# Patient Record
Sex: MALE | Race: OTHER | Employment: FULL TIME | URBAN - METROPOLITAN AREA
[De-identification: names, ages, dates, MRNs, and addresses within clinical notes are randomized per-mention and may not be internally consistent; named-entity substitution may affect disease eponyms.]

---

## 2017-04-24 NOTE — TELEPHONE ENCOUNTER
Patient is calling to request a refill for medication listed below. Please advise. Amlodipine-Olmesartan (ANTONIO) 10-40 MG Oral Tab Take 1 tablet by mouth daily.  Disp: 90 tablet Rfl: 2

## 2017-04-26 RX ORDER — AMLODIPINE AND OLMESARTAN MEDOXOMIL 10; 40 MG/1; MG/1
1 TABLET ORAL DAILY
Qty: 90 TABLET | Refills: 0 | OUTPATIENT
Start: 2017-04-26

## 2017-04-26 NOTE — TELEPHONE ENCOUNTER
Pt states he cant come see him unless it's a Saturday. Pt is asking if MD can please see him this Saturday, states the earliest possible. Pt states due to his job, it's hard for him to come in. Please advise.

## 2017-04-26 NOTE — TELEPHONE ENCOUNTER
Protocol failed.      CSS please schedule appt with PCP  Hypertensive Medications  Protocol Criteria:  · Appointment scheduled in the past 6 months or in the next 3 months  · BMP or CMP in the past 12 months  · Creatinine result < 2  Recent Visits       Pro

## 2017-05-22 ENCOUNTER — OFFICE VISIT (OUTPATIENT)
Dept: FAMILY MEDICINE CLINIC | Facility: CLINIC | Age: 32
End: 2017-05-22

## 2017-05-22 VITALS
TEMPERATURE: 99 F | HEIGHT: 71 IN | WEIGHT: 241 LBS | DIASTOLIC BLOOD PRESSURE: 100 MMHG | SYSTOLIC BLOOD PRESSURE: 180 MMHG | BODY MASS INDEX: 33.74 KG/M2 | HEART RATE: 73 BPM

## 2017-05-22 DIAGNOSIS — I10 ESSENTIAL HYPERTENSION WITH GOAL BLOOD PRESSURE LESS THAN 130/80: Primary | ICD-10-CM

## 2017-05-22 DIAGNOSIS — R73.03 PREDIABETES: ICD-10-CM

## 2017-05-22 DIAGNOSIS — E78.2 MIXED HYPERLIPIDEMIA: ICD-10-CM

## 2017-05-22 PROCEDURE — 99212 OFFICE O/P EST SF 10 MIN: CPT | Performed by: FAMILY MEDICINE

## 2017-05-22 PROCEDURE — 99214 OFFICE O/P EST MOD 30 MIN: CPT | Performed by: FAMILY MEDICINE

## 2017-05-22 RX ORDER — ATORVASTATIN CALCIUM 40 MG/1
TABLET, FILM COATED ORAL
Qty: 90 TABLET | Refills: 2 | Status: SHIPPED | OUTPATIENT
Start: 2017-05-22 | End: 2018-02-08

## 2017-05-22 RX ORDER — AMLODIPINE AND OLMESARTAN MEDOXOMIL 10; 40 MG/1; MG/1
1 TABLET ORAL DAILY
Qty: 90 TABLET | Refills: 2 | Status: SHIPPED | OUTPATIENT
Start: 2017-05-22 | End: 2018-02-08

## 2017-05-22 NOTE — PROGRESS NOTES
5/22/2017  6:40 PM    Abdullahi Sanchez is a 32year old male. Chief complaint(s): Patient presents with: Follow - Up: Patient needs refills on his medications.    HTN  Hyperlipidemia    HPI:     Lon Cisse primary complaint is regarding Immunization History  Administered            Date(s) Administered    TDAP                  12/17/2015      Medications (Active prior to today's visit):    Current Outpatient Prescriptions:  Amlodipine-Olmesartan (ANTONIO) 10-40 MG Oral Tab Take 1 tablet - 200 mg/dL   Triglycerides 78 1 - 149 mg/dL   LDL Cholesterol 82 0 - 99 mg/dL   Non HDL Chol 98 <130 mg/dL     EKG / Spirometry : -     Radiology: No results found. -    ASSESSMENT/PLAN:   Assessment  Essential hypertension with goal blood pressure less t concerns. Notify Dr Ramy Mejia or the [facility], [facility] if there is a deterioration or worsening of the medical condition. Also, inform the doctor with any new symptoms or medications' side effects. REFUSALS:  none.     FOLLOW-UP: Schedule a follow-up visit

## 2017-05-26 ENCOUNTER — APPOINTMENT (OUTPATIENT)
Dept: LAB | Age: 32
End: 2017-05-26
Attending: FAMILY MEDICINE
Payer: COMMERCIAL

## 2017-05-26 DIAGNOSIS — E78.2 MIXED HYPERLIPIDEMIA: ICD-10-CM

## 2017-05-26 DIAGNOSIS — I10 ESSENTIAL HYPERTENSION WITH GOAL BLOOD PRESSURE LESS THAN 130/80: ICD-10-CM

## 2017-05-26 DIAGNOSIS — R73.03 PREDIABETES: ICD-10-CM

## 2017-05-26 PROCEDURE — 80053 COMPREHEN METABOLIC PANEL: CPT

## 2017-05-26 PROCEDURE — 36415 COLL VENOUS BLD VENIPUNCTURE: CPT

## 2017-05-26 PROCEDURE — 80061 LIPID PANEL: CPT

## 2017-05-26 PROCEDURE — 83036 HEMOGLOBIN GLYCOSYLATED A1C: CPT

## 2017-05-29 ENCOUNTER — TELEPHONE (OUTPATIENT)
Dept: INTERNAL MEDICINE CLINIC | Facility: CLINIC | Age: 32
End: 2017-05-29

## 2017-05-29 NOTE — TELEPHONE ENCOUNTER
Results and recommendations sent via [employer].     ----- Message from Aby Becker MD sent at 5/28/2017  9:24 PM CDT -----  Please call patient inform of elevated triglycerides. Is he taking medication?   If so we need to add another if not to start and

## 2017-05-29 NOTE — PROGRESS NOTES
Quick Note:    Please call patient inform of elevated triglycerides. Is he taking medication?  If so we need to add another if not to start and recheck in 6 months.  ______

## 2017-06-02 NOTE — TELEPHONE ENCOUNTER
Pt has not yet viewed [employer] results and message.   TCB may transfer to [phone]

## 2018-02-09 RX ORDER — AMLODIPINE AND OLMESARTAN MEDOXOMIL 10; 40 MG/1; MG/1
TABLET ORAL
Qty: 90 TABLET | Refills: 0 | Status: SHIPPED | OUTPATIENT
Start: 2018-02-09 | End: 2018-05-28

## 2018-02-09 RX ORDER — ATORVASTATIN CALCIUM 40 MG/1
TABLET, FILM COATED ORAL
Qty: 90 TABLET | Refills: 0 | Status: SHIPPED | OUTPATIENT
Start: 2018-02-09 | End: 2018-05-28

## 2018-03-26 ENCOUNTER — OFFICE VISIT (OUTPATIENT)
Dept: FAMILY MEDICINE CLINIC | Facility: CLINIC | Age: 33
End: 2018-03-26

## 2018-03-26 ENCOUNTER — LAB ENCOUNTER (OUTPATIENT)
Dept: LAB | Age: 33
End: 2018-03-26
Attending: FAMILY MEDICINE
Payer: COMMERCIAL

## 2018-03-26 VITALS
TEMPERATURE: 98 F | DIASTOLIC BLOOD PRESSURE: 88 MMHG | BODY MASS INDEX: 31 KG/M2 | HEART RATE: 84 BPM | WEIGHT: 224 LBS | SYSTOLIC BLOOD PRESSURE: 149 MMHG

## 2018-03-26 DIAGNOSIS — E11.9 TYPE 2 DIABETES MELLITUS WITHOUT COMPLICATION, WITHOUT LONG-TERM CURRENT USE OF INSULIN (HCC): ICD-10-CM

## 2018-03-26 DIAGNOSIS — R01.1 CARDIAC MURMUR: ICD-10-CM

## 2018-03-26 DIAGNOSIS — E11.9 TYPE 2 DIABETES MELLITUS WITHOUT COMPLICATION, WITHOUT LONG-TERM CURRENT USE OF INSULIN (HCC): Primary | ICD-10-CM

## 2018-03-26 DIAGNOSIS — I10 ESSENTIAL HYPERTENSION WITH GOAL BLOOD PRESSURE LESS THAN 130/80: ICD-10-CM

## 2018-03-26 LAB
ALBUMIN SERPL BCP-MCNC: 4.4 G/DL (ref 3.5–4.8)
ALBUMIN/GLOB SERPL: 1.4 {RATIO} (ref 1–2)
ALP SERPL-CCNC: 92 U/L (ref 32–100)
ALT SERPL-CCNC: 46 U/L (ref 17–63)
ANION GAP SERPL CALC-SCNC: 8 MMOL/L (ref 0–18)
AST SERPL-CCNC: 28 U/L (ref 15–41)
BILIRUB SERPL-MCNC: 0.5 MG/DL (ref 0.3–1.2)
BUN SERPL-MCNC: 10 MG/DL (ref 8–20)
BUN/CREAT SERPL: 12.5 (ref 10–20)
CALCIUM SERPL-MCNC: 9.2 MG/DL (ref 8.5–10.5)
CHLORIDE SERPL-SCNC: 100 MMOL/L (ref 95–110)
CO2 SERPL-SCNC: 27 MMOL/L (ref 22–32)
CREAT SERPL-MCNC: 0.8 MG/DL (ref 0.5–1.5)
CREAT UR-MCNC: 76 MG/DL
GLOBULIN PLAS-MCNC: 3.2 G/DL (ref 2.5–3.7)
GLUCOSE SERPL-MCNC: 198 MG/DL (ref 70–99)
MICROALBUMIN UR-MCNC: 0.7 MG/DL (ref 0–1.8)
MICROALBUMIN/CREAT UR: 9.2 MG/G{CREAT} (ref 0–20)
OSMOLALITY UR CALC.SUM OF ELEC: 285 MOSM/KG (ref 275–295)
PATIENT FASTING: YES
POTASSIUM SERPL-SCNC: 3.8 MMOL/L (ref 3.3–5.1)
PROT SERPL-MCNC: 7.6 G/DL (ref 5.9–8.4)
SODIUM SERPL-SCNC: 135 MMOL/L (ref 136–144)

## 2018-03-26 PROCEDURE — 93005 ELECTROCARDIOGRAM TRACING: CPT

## 2018-03-26 PROCEDURE — 99214 OFFICE O/P EST MOD 30 MIN: CPT | Performed by: FAMILY MEDICINE

## 2018-03-26 PROCEDURE — 99212 OFFICE O/P EST SF 10 MIN: CPT | Performed by: FAMILY MEDICINE

## 2018-03-26 PROCEDURE — 82570 ASSAY OF URINE CREATININE: CPT

## 2018-03-26 PROCEDURE — 82043 UR ALBUMIN QUANTITATIVE: CPT

## 2018-03-26 PROCEDURE — 80053 COMPREHEN METABOLIC PANEL: CPT

## 2018-03-26 PROCEDURE — 36415 COLL VENOUS BLD VENIPUNCTURE: CPT

## 2018-03-26 PROCEDURE — 83036 HEMOGLOBIN GLYCOSYLATED A1C: CPT

## 2018-03-26 PROCEDURE — 93010 ELECTROCARDIOGRAM REPORT: CPT | Performed by: FAMILY MEDICINE

## 2018-03-26 NOTE — PROGRESS NOTES
3/26/2018  5:52 PM    Howard Alvarado is a 28year old male. Chief complaint(s): No chief complaint on file. HPI:     Howard Alvarado primary complaint is regarding DM.      Patient Howard Alvarado is a 28year old male is here to be evaluated for typ Cigarettes  Smokeless tobacco: Former User                     Alcohol use:  No                 Immunizations:     Immunization History  Administered            Date(s) Administered    TDAP                  12/17/2015      Medications (Active prior to today EKG / Spirometry : -     Radiology: No results found. -    ASSESSMENT/PLAN:   Assessment   Type 2 diabetes mellitus without complication, without long-term current use of insulin (hcc)  (primary encounter diagnosis)  Cardiac murmur  Essential hypertens goal blood pressure less than 130/80     MEDICATIONS: CPM  LABORATORY & ORDERS:   Orders Placed This Encounter      ** Hemoglobin A1C  [E]      ** Microalbumin [id number]      Comp Metabolic Panel (14) [E]  RECOMMENDATIONS given include: avoid pseudoephedrine

## 2018-03-27 ENCOUNTER — TELEPHONE (OUTPATIENT)
Dept: FAMILY MEDICINE CLINIC | Facility: CLINIC | Age: 33
End: 2018-03-27

## 2018-03-27 DIAGNOSIS — E11.9 CONTROLLED TYPE 2 DIABETES MELLITUS WITHOUT COMPLICATION, WITHOUT LONG-TERM CURRENT USE OF INSULIN (HCC): Primary | ICD-10-CM

## 2018-03-27 LAB — HBA1C MFR BLD: 10.2 % (ref 4–6)

## 2018-03-27 NOTE — TELEPHONE ENCOUNTER
Advised patient on Dr. Deb Schwab information and recommendations, including name of medication, dose, administration. Patient verbalized understanding. His follow-up appt is 4/9/18 at 5 pm. Advised to call back if any issues; he agreed.

## 2018-03-27 NOTE — TELEPHONE ENCOUNTER
Wife (not on HIPAA), patient gave verbal permission to speak to wife. Script was not at pharmacy. New script created, sent to pharmacy (see below). Advised to call back if any issues; wife agreed.   Pharmacy     [address], [place]

## 2018-03-27 NOTE — TELEPHONE ENCOUNTER
----- Message from Chris Coburn MD sent at 3/27/2018  9:01 AM CDT -----  Please call patient, results showed uncontrol diabetes, Rx sent, KPA

## 2018-03-28 DIAGNOSIS — E11.9 CONTROLLED TYPE 2 DIABETES MELLITUS WITHOUT COMPLICATION, WITHOUT LONG-TERM CURRENT USE OF INSULIN (HCC): ICD-10-CM

## 2018-03-28 NOTE — TELEPHONE ENCOUNTER
Katerina (wife) returned call to follow up on Contour Next lancet and strips refill request.  Pt is diabetic and is testing three times per day. Contour next lancet and strips refilled each for 3 months supply.     Diabetes Medications  Protocol Criteria:  ·

## 2018-03-29 ENCOUNTER — TELEPHONE (OUTPATIENT)
Dept: OTHER | Age: 33
End: 2018-03-29

## 2018-03-29 RX ORDER — LANCETS
EACH MISCELLANEOUS
Qty: 100 EACH | Refills: 3 | Status: SHIPPED | OUTPATIENT
Start: 2018-03-29

## 2018-03-29 NOTE — TELEPHONE ENCOUNTER
----- Message from Genaro Carty MD sent at 3/27/2018  9:01 AM CDT -----  Please call patient, results showed uncontrol diabetes, Rx sent, KPA

## 2018-03-29 NOTE — TELEPHONE ENCOUNTER
Patient already advised on the results on 3/27/18. He started taking the medication as ordered and is testing his blood sugar. Two days ago, before the new medication, his blood sugar was 250. Yesterday his evening blood sugar was 137.  Today fasting blood

## 2018-04-02 ENCOUNTER — HOSPITAL ENCOUNTER (OUTPATIENT)
Dept: CV DIAGNOSTICS | Facility: HOSPITAL | Age: 33
Discharge: HOME | End: 2018-04-02
Attending: FAMILY MEDICINE
Payer: COMMERCIAL

## 2018-04-02 DIAGNOSIS — R01.1 CARDIAC MURMUR: ICD-10-CM

## 2018-04-02 PROCEDURE — 93306 TTE W/DOPPLER COMPLETE: CPT | Performed by: FAMILY MEDICINE

## 2018-04-09 ENCOUNTER — OFFICE VISIT (OUTPATIENT)
Dept: FAMILY MEDICINE CLINIC | Facility: CLINIC | Age: 33
End: 2018-04-09

## 2018-04-09 VITALS
SYSTOLIC BLOOD PRESSURE: 144 MMHG | HEART RATE: 87 BPM | TEMPERATURE: 99 F | HEIGHT: 71 IN | WEIGHT: 222 LBS | BODY MASS INDEX: 31.08 KG/M2 | DIASTOLIC BLOOD PRESSURE: 82 MMHG

## 2018-04-09 DIAGNOSIS — E11.9 TYPE 2 DIABETES MELLITUS WITHOUT COMPLICATION, WITHOUT LONG-TERM CURRENT USE OF INSULIN (HCC): Primary | ICD-10-CM

## 2018-04-09 DIAGNOSIS — R01.1 CARDIAC MURMUR: ICD-10-CM

## 2018-04-09 PROCEDURE — 99214 OFFICE O/P EST MOD 30 MIN: CPT | Performed by: FAMILY MEDICINE

## 2018-04-09 PROCEDURE — 99212 OFFICE O/P EST SF 10 MIN: CPT | Performed by: FAMILY MEDICINE

## 2018-04-09 NOTE — PROGRESS NOTES
4/9/2018  5:06 PM    Rosalina Srinivasan is a 28year old male. Chief complaint(s): Patient presents with: Follow - Up: patient here to discuss lab results   Diabetes  Eye Pain: watery eyes    HPI:     Rosalina Srinivasan primary complaint is regarding DM. Grandmother      HD ?       Social History: Smoking status: Former Smoker                                                              Packs/day: 0.25      Years: 0.00         Types: Cigarettes  Smokeless tobacco: Former User                     Alcohol use Ear: External ear normal.   Left Ear: External ear normal.   Nose: Nose normal. No rhinorrhea. Mouth/Throat: Oropharynx is clear and moist and mucous membranes are normal.   Eyes: Conjunctivae are normal. No scleral icterus. Neck: Neck supple.    Cardio A&P    LABORATORY & ORDERS: Blood test(s) ordered today ;   Orders Placed This Encounter      ** Hemoglobin A1C  [E]  Additional orders include:   MEDICATIONS:  •  MICROLET LANCETS Does not apply Misc, TEST BLOOD SUGAR THREE TIMES DAILY, Disp: 100 each, Rf questions or concerns. Notify Dr Dev Mccracken or the [facility], [facility] if there is a deterioration or worsening of the medical condition. Also, inform the doctor with any new symptoms or medications' side effects.       FOLLOW-UP: Schedule a follow-up visit in  p

## 2018-05-28 DIAGNOSIS — E11.9 CONTROLLED TYPE 2 DIABETES MELLITUS WITHOUT COMPLICATION, WITHOUT LONG-TERM CURRENT USE OF INSULIN (HCC): ICD-10-CM

## 2018-05-29 NOTE — TELEPHONE ENCOUNTER
From: Gilford Fragmin  Sent: 5/28/2018 9:32 PM CDT  Subject: Medication Renewal Request    Gilford Fragmin would like a refill of the following medications:     ATORVASTATIN 40 MG Oral Tab Edouard Norris MD]     Glucose Blood (HÉCTOR CONTOUR NEXT TEST)

## 2018-05-29 NOTE — TELEPHONE ENCOUNTER
From: Shira Pantoja  Sent: 5/28/2018 9:30 PM CDT  Subject: Medication Renewal Request    Shira Pantoja would like a refill of the following medications:     AMLODIPINE-OLMESARTAN 10-40 MG Oral Tab Luis Callahan MD]    Preferred pharmacy: [facility]

## 2018-05-31 RX ORDER — ATORVASTATIN CALCIUM 40 MG/1
TABLET, FILM COATED ORAL
Qty: 90 TABLET | Refills: 0 | Status: SHIPPED | OUTPATIENT
Start: 2018-05-31 | End: 2018-08-31

## 2018-05-31 RX ORDER — AMLODIPINE AND OLMESARTAN MEDOXOMIL 10; 40 MG/1; MG/1
1 TABLET ORAL
Qty: 90 TABLET | Refills: 0 | Status: SHIPPED | OUTPATIENT
Start: 2018-05-31 | End: 2018-08-31

## 2018-05-31 NOTE — TELEPHONE ENCOUNTER
Hypertensive Medications  Protocol Criteria:  · Appointment scheduled in the past 6 months or in the next 3 months  · BMP or CMP in the past 12 months  · Creatinine result < 2  Recent Outpatient Visits            1 month ago Type 2 diabetes mellitus withou

## 2018-05-31 NOTE — TELEPHONE ENCOUNTER
Patient failed protocol. Script pended. Please advise.     Cholesterol Medications  Protocol Criteria:  · Appointment scheduled in the past 12 months or in the next 3 months  · ALT & LDL on file in the past 12 months  · ALT result < 80  · LDL result <130 Demetrius Triana MD    Office Visit    1 year ago Essential hypertension with goal blood pressure less than 130/80    [facility], [facility], [address], [place] Demetrius Triana MD    Office Visit    2 years ago Essential hypertension with goal blood pressu

## 2018-06-19 NOTE — TELEPHONE ENCOUNTER
From: Tylor Gomez  Sent: 6/19/2018 9:45 AM CDT  Subject: Medication Renewal Request    Tylor Gomez would like a refill of the following medications:      SITagliptin-MetFORMIN HCl  MG Oral Tab Maricarmen Florence MD]    Preferred pharmacy: [facility]

## 2018-06-20 NOTE — TELEPHONE ENCOUNTER
Chart reviewed, on 3/27/18 sitagliptin-metformin 50-500mg one tablet twice daily refilled by staff for quantity 180 plus 2 additional refills. Good for 9 months total.   Left message at [address] [employer] voice mailbox to proceed with next refill.  If any q

## 2018-07-28 ENCOUNTER — LAB ENCOUNTER (OUTPATIENT)
Dept: LAB | Age: 33
End: 2018-07-28
Attending: FAMILY MEDICINE
Payer: COMMERCIAL

## 2018-07-28 DIAGNOSIS — E11.9 TYPE 2 DIABETES MELLITUS WITHOUT COMPLICATION, WITHOUT LONG-TERM CURRENT USE OF INSULIN (HCC): ICD-10-CM

## 2018-07-28 PROCEDURE — 36415 COLL VENOUS BLD VENIPUNCTURE: CPT

## 2018-07-28 PROCEDURE — 83036 HEMOGLOBIN GLYCOSYLATED A1C: CPT

## 2018-07-29 LAB — HBA1C MFR BLD: 6 % (ref 4–6)

## 2018-08-04 ENCOUNTER — OFFICE VISIT (OUTPATIENT)
Dept: FAMILY MEDICINE CLINIC | Facility: CLINIC | Age: 33
End: 2018-08-04

## 2018-08-04 VITALS
HEART RATE: 71 BPM | DIASTOLIC BLOOD PRESSURE: 78 MMHG | WEIGHT: 224 LBS | SYSTOLIC BLOOD PRESSURE: 131 MMHG | HEIGHT: 71 IN | TEMPERATURE: 98 F | BODY MASS INDEX: 31.36 KG/M2

## 2018-08-04 DIAGNOSIS — E11.9 TYPE 2 DIABETES MELLITUS WITHOUT COMPLICATION, WITHOUT LONG-TERM CURRENT USE OF INSULIN (HCC): Primary | ICD-10-CM

## 2018-08-04 DIAGNOSIS — I10 ESSENTIAL HYPERTENSION WITH GOAL BLOOD PRESSURE LESS THAN 130/80: ICD-10-CM

## 2018-08-04 PROCEDURE — 99212 OFFICE O/P EST SF 10 MIN: CPT | Performed by: FAMILY MEDICINE

## 2018-08-04 PROCEDURE — 99214 OFFICE O/P EST MOD 30 MIN: CPT | Performed by: FAMILY MEDICINE

## 2018-08-04 NOTE — PROGRESS NOTES
8/4/2018  9:25 AM    Francine Nicole is a 35year old male. Chief complaint(s): Patient presents with:  HTN: follow up   Diabetes: follow up     HPI:     Francine Nicole primary complaint is regarding as above.      Patient Quinton Moyer a 35 year o no surgical interventions were required, self expeled      Past Surgical History:  age 3: OTHER SURGICAL HISTORY      Comment: facial plastic surgery 2nd to dog bite   Family History   Problem Relation Age of Onset   • Gastro-Intestinal Disorder Father neck pain. Skin: Negative for rash. Neurological: Negative for seizures and headaches. Psychiatric/Behavioral: Negative for depressed mood. PHYSICAL EXAM:   Physical Exam    Constitutional: He appears well-developed and well-nourished.     Strip, Test blood sugar three times per day., Disp: 100 strip, Rfl: 3  •  MICROLET LANCETS Does not apply Misc, TEST BLOOD SUGAR THREE TIMES DAILY, Disp: 100 each, Rfl: 3  •  SITagliptin-MetFORMIN HCl  MG Oral Tab, Take 1 tablet by mouth 2 (two) time Encounter      ** Hemoglobin A1C  [E]      ** Microalbumin [id number]    Meds This Visit:    No prescriptions requested or ordered in this encounter    Imaging & Referrals:  Everardo Malcolm MD

## 2018-09-01 RX ORDER — AMLODIPINE AND OLMESARTAN MEDOXOMIL 10; 40 MG/1; MG/1
TABLET ORAL
Qty: 90 TABLET | Refills: 0 | Status: SHIPPED | OUTPATIENT
Start: 2018-09-01 | End: 2018-11-21

## 2018-09-01 RX ORDER — ATORVASTATIN CALCIUM 40 MG/1
TABLET, FILM COATED ORAL
Qty: 90 TABLET | Refills: 0 | Status: SHIPPED | OUTPATIENT
Start: 2018-09-01 | End: 2018-11-27

## 2018-09-01 NOTE — TELEPHONE ENCOUNTER
Please review and advise regarding pended refill request as unable to refill per protocol.     Other med(s) refilled per [facility], [facility] protocol:    Hypertensive Medications  Protocol Criteria:  · Appointment scheduled in the past 6 months or in the next 3

## 2018-09-06 ENCOUNTER — TELEPHONE (OUTPATIENT)
Dept: FAMILY MEDICINE CLINIC | Facility: CLINIC | Age: 33
End: 2018-09-06

## 2018-09-06 NOTE — TELEPHONE ENCOUNTER
Consult from [facility] received, information entered into flow sheet and copy sent to scanning. Consult reviewed by Dr Julia Tobin.

## 2018-09-25 NOTE — TELEPHONE ENCOUNTER
Diabetes Medications  Protocol Criteria:  · Appointment scheduled in the past 6 months or the next 3 months  · A1C < 7.5 in the past 6 months  · Creatinine in the past 12 months  · Creatinine result < 1.5   Recent Outpatient Visits            1 month ago T

## 2018-11-23 RX ORDER — AMLODIPINE AND OLMESARTAN MEDOXOMIL 10; 40 MG/1; MG/1
1 TABLET ORAL
Qty: 90 TABLET | Refills: 0 | Status: SHIPPED | OUTPATIENT
Start: 2018-11-23 | End: 2019-01-28

## 2018-11-23 NOTE — TELEPHONE ENCOUNTER
Refill passed per [facility], [facility] protocol.   Hypertensive Medications  Protocol Criteria:  · Appointment scheduled in the past 6 months or in the next 3 months  · BMP or CMP in the past 12 months  · Creatinine result < 2  Recent Outpatient Visits

## 2018-11-27 RX ORDER — ATORVASTATIN CALCIUM 40 MG/1
TABLET, FILM COATED ORAL
Qty: 90 TABLET | Refills: 0 | Status: SHIPPED | OUTPATIENT
Start: 2018-11-27 | End: 2019-01-28

## 2018-11-27 NOTE — TELEPHONE ENCOUNTER
Dr Tatiana Caraballo to advise on pended atorvastatin 40 mg refill request.   Failed protocol. Overdue for lipid panel.      Cholesterol Medications  Protocol Criteria:  · Appointment scheduled in the past 12 months or in the next 3 months  · ALT & LDL on file in

## 2018-11-30 ENCOUNTER — LAB ENCOUNTER (OUTPATIENT)
Dept: LAB | Age: 33
End: 2018-11-30
Attending: FAMILY MEDICINE
Payer: COMMERCIAL

## 2018-11-30 DIAGNOSIS — E11.9 DIABETES MELLITUS (HCC): Primary | ICD-10-CM

## 2018-11-30 PROCEDURE — 82043 UR ALBUMIN QUANTITATIVE: CPT

## 2018-11-30 PROCEDURE — 83036 HEMOGLOBIN GLYCOSYLATED A1C: CPT

## 2018-11-30 PROCEDURE — 36415 COLL VENOUS BLD VENIPUNCTURE: CPT

## 2018-11-30 PROCEDURE — 82570 ASSAY OF URINE CREATININE: CPT

## 2019-01-28 ENCOUNTER — OFFICE VISIT (OUTPATIENT)
Dept: FAMILY MEDICINE CLINIC | Facility: CLINIC | Age: 34
End: 2019-01-28

## 2019-01-28 VITALS
HEART RATE: 85 BPM | RESPIRATION RATE: 16 BRPM | WEIGHT: 216 LBS | DIASTOLIC BLOOD PRESSURE: 78 MMHG | BODY MASS INDEX: 30.24 KG/M2 | SYSTOLIC BLOOD PRESSURE: 130 MMHG | HEIGHT: 71 IN | TEMPERATURE: 98 F

## 2019-01-28 DIAGNOSIS — I10 ESSENTIAL HYPERTENSION WITH GOAL BLOOD PRESSURE LESS THAN 130/80: ICD-10-CM

## 2019-01-28 DIAGNOSIS — E11.9 TYPE 2 DIABETES MELLITUS WITHOUT COMPLICATION, WITHOUT LONG-TERM CURRENT USE OF INSULIN (HCC): Primary | ICD-10-CM

## 2019-01-28 DIAGNOSIS — L60.0 INGROWING TOENAIL: ICD-10-CM

## 2019-01-28 PROCEDURE — 99212 OFFICE O/P EST SF 10 MIN: CPT | Performed by: FAMILY MEDICINE

## 2019-01-28 PROCEDURE — 99214 OFFICE O/P EST MOD 30 MIN: CPT | Performed by: FAMILY MEDICINE

## 2019-01-28 RX ORDER — ATORVASTATIN CALCIUM 40 MG/1
TABLET, FILM COATED ORAL
Qty: 90 TABLET | Refills: 1 | Status: SHIPPED | OUTPATIENT
Start: 2019-01-28 | End: 2020-02-08

## 2019-01-28 RX ORDER — AMLODIPINE AND OLMESARTAN MEDOXOMIL 10; 40 MG/1; MG/1
1 TABLET ORAL
Qty: 90 TABLET | Refills: 0 | Status: SHIPPED | OUTPATIENT
Start: 2019-01-28 | End: 2019-04-28

## 2019-01-29 NOTE — PROGRESS NOTES
1/28/2019  6:07 PM    Verónica Dean is a 35year old male. Chief complaint(s): Patient presents with:  Diabetes: Follow  up, Pt saw Dr Bakari Lima 3 months ago.  Pt requesting a DOT letter  HTN    HPI:     Verónica Dean primary complaint is regar hypertension    • Hyperlipidemia    • Renal stone 2009    no surgical interventions were required, self expeled      Past Surgical History:   Procedure Laterality Date   • OTHER SURGICAL HISTORY  age 2    facial plastic surgery 2nd to dog bite      Family Endocrine: Negative for polydipsia and polyuria. Musculoskeletal: Negative for back pain and neck pain. Skin: Negative for rash. Ingrowing toenails big toes bilateral   Neurological: Negative for seizures and headaches.    Psychiatric/Behaviora Blood test(s) ordered today ; Orders Placed This Encounter      ** Hemoglobin A1C  [E]      Microalb/Creat Ratio, Random Urine    Additional orders include:   MEDICATIONS:  •  Amlodipine-Olmesartan 10-40 MG Oral Tab, Take 1 tablet by mouth once daily. , Dis glucoses (<140-160 mg/dl) Home glucose testing discussed. The A1c target of <7% according to ADA and <6.5% according to AACE were discussed.       2. Essential hypertension with goal blood pressure less than 130/80   HTN     MEDICATIONS:   Requested Prescri  MG Oral Tab 180 tablet 0     Sig: Take 1 tablet by mouth 2 (two) times daily with meals.    • atorvastatin 40 MG Oral Tab 90 tablet 1     Sig: TAKE 1 TABLET BY MOUTH EVERY NIGHT AT BEDTIME       Imaging & Referrals:  None         Tyrone Moore MD

## 2019-03-10 RX ORDER — SITAGLIPTIN AND METFORMIN HYDROCHLORIDE 50; 500 MG/1; MG/1
TABLET, FILM COATED ORAL
Qty: 180 TABLET | Refills: 0 | Status: SHIPPED | OUTPATIENT
Start: 2019-03-10 | End: 2019-06-08

## 2019-04-28 NOTE — TELEPHONE ENCOUNTER
Please review; protocol failed.  D/t labs  Requested Prescriptions     Pending Prescriptions Disp Refills   • AMLODIPINE-OLMESARTAN 10-40 MG Oral Tab [Pharmacy Med Name: AMLODIPINE/OLMES MEDOXOM 10-40MG T] 90 tablet 0     Sig: TAKE 1 TABLET BY MOUTH EVERY D

## 2019-04-29 RX ORDER — AMLODIPINE AND OLMESARTAN MEDOXOMIL 10; 40 MG/1; MG/1
TABLET ORAL
Qty: 90 TABLET | Refills: 0 | Status: SHIPPED | OUTPATIENT
Start: 2019-04-29 | End: 2019-08-20

## 2019-06-05 ENCOUNTER — APPOINTMENT (OUTPATIENT)
Dept: LAB | Age: 34
End: 2019-06-05
Attending: FAMILY MEDICINE
Payer: COMMERCIAL

## 2019-06-05 DIAGNOSIS — E11.9 TYPE 2 DIABETES MELLITUS WITHOUT COMPLICATION, WITHOUT LONG-TERM CURRENT USE OF INSULIN (HCC): ICD-10-CM

## 2019-06-05 PROCEDURE — 83036 HEMOGLOBIN GLYCOSYLATED A1C: CPT

## 2019-06-05 PROCEDURE — 36415 COLL VENOUS BLD VENIPUNCTURE: CPT

## 2019-06-05 PROCEDURE — 82043 UR ALBUMIN QUANTITATIVE: CPT

## 2019-06-05 PROCEDURE — 82570 ASSAY OF URINE CREATININE: CPT

## 2019-06-08 ENCOUNTER — OFFICE VISIT (OUTPATIENT)
Dept: FAMILY MEDICINE CLINIC | Facility: CLINIC | Age: 34
End: 2019-06-08

## 2019-06-08 VITALS
DIASTOLIC BLOOD PRESSURE: 87 MMHG | HEART RATE: 84 BPM | BODY MASS INDEX: 31.36 KG/M2 | WEIGHT: 224 LBS | SYSTOLIC BLOOD PRESSURE: 138 MMHG | TEMPERATURE: 99 F | HEIGHT: 71 IN

## 2019-06-08 DIAGNOSIS — E11.9 TYPE 2 DIABETES MELLITUS WITHOUT COMPLICATION, WITHOUT LONG-TERM CURRENT USE OF INSULIN (HCC): Primary | ICD-10-CM

## 2019-06-08 PROCEDURE — 99212 OFFICE O/P EST SF 10 MIN: CPT | Performed by: FAMILY MEDICINE

## 2019-06-08 PROCEDURE — 99213 OFFICE O/P EST LOW 20 MIN: CPT | Performed by: FAMILY MEDICINE

## 2019-06-08 NOTE — PROGRESS NOTES
6/8/2019  9:08 AM    Lluvia Smith is a 29year old male. Chief complaint(s): Patient presents with:  Lab Results: done 6/5/19 f/u  Diabetes  HPI:     Lluvia Smith primary complaint is regarding Diabetes.      Patient Karie Rebollar a 35 year ol status: Former Smoker        Packs/day: 0.25        Types: Cigarettes      Smokeless tobacco: Former User    Alcohol use: No      Alcohol/week: 0.0 oz      Comment: quit 4 years ago    Drug use: No       Immunizations:     Immunization History  Administere icterus. Neck: Neck supple. Cardiovascular: Normal rate and regular rhythm. Pulmonary/Chest:   Clear to ascultation bilaterally. Feet:     Right Foot:   Monofilament Exam: 10 sites tested. 10 sites sensed.    Left Foot:   Monofilament Exam: 10 site Tab 180 tablet 0     Sig: Take 1 tablet by mouth 2 (two) times daily with meals.         REFERRALS:  Ophthomology Dr Uche Will: instructed in use of glucometer ( check fasting glucose rarely), return for training in administering insulin i

## 2019-06-12 ENCOUNTER — TELEPHONE (OUTPATIENT)
Dept: FAMILY MEDICINE CLINIC | Facility: CLINIC | Age: 34
End: 2019-06-12

## 2019-06-12 NOTE — TELEPHONE ENCOUNTER
Patient is currently on Janumet- started on 3/27/18 for newly diagnosed DM. Would any of the following generic alternatives be appropriate: ACARBOSE, GLIMEPIRIDE, GLIPIZIDE, METFORMIN (500 (and ER), 750 (ER), 850 mg TAB), NATEGLINIDE, PIOGLITAZONE?

## 2019-08-22 RX ORDER — AMLODIPINE AND OLMESARTAN MEDOXOMIL 10; 40 MG/1; MG/1
1 TABLET ORAL
Qty: 90 TABLET | Refills: 1 | Status: SHIPPED | OUTPATIENT
Start: 2019-08-22 | End: 2020-02-03

## 2019-08-22 NOTE — TELEPHONE ENCOUNTER
Please review; protocol failed. Requested Prescriptions     Pending Prescriptions Disp Refills   • amLODIPine-Olmesartan 10-40 MG Oral Tab 90 tablet 0     Sig: Take 1 tablet by mouth once daily.    • SITagliptin-metFORMIN HCl  MG Oral Tab 180 table

## 2020-02-03 RX ORDER — AMLODIPINE AND OLMESARTAN MEDOXOMIL 10; 40 MG/1; MG/1
TABLET ORAL
Qty: 90 TABLET | Refills: 1 | Status: SHIPPED | OUTPATIENT
Start: 2020-02-03 | End: 2020-03-20

## 2020-02-03 NOTE — TELEPHONE ENCOUNTER
Requested Prescriptions     Pending Prescriptions Disp Refills   • AMLODIPINE-OLMESARTAN 10-40 MG Oral Tab [Pharmacy Med Name: AMLODIPINE/OLMES MEDOXOM 10-40MG T] 90 tablet 1     Sig: TAKE 1 TABLET BY MOUTH EVERY DAY         Recent Visits  Date Type Provid

## 2020-02-09 NOTE — TELEPHONE ENCOUNTER
Refill passed per [place] [facility], [facility] protocol.     Requested Prescriptions   Pending Prescriptions Disp Refills   • atorvastatin 40 MG Oral Tab 90 tablet 1     Sig: TAKE 1 TABLET BY MOUTH EVERY NIGHT AT BEDTIME       Cholesterol Medication Protocol Failed - 2/

## 2020-02-10 RX ORDER — ATORVASTATIN CALCIUM 40 MG/1
TABLET, FILM COATED ORAL
Qty: 90 TABLET | Refills: 1 | Status: SHIPPED | OUTPATIENT
Start: 2020-02-10 | End: 2020-03-20

## 2020-02-22 ENCOUNTER — APPOINTMENT (OUTPATIENT)
Dept: LAB | Age: 35
End: 2020-02-22
Attending: FAMILY MEDICINE
Payer: COMMERCIAL

## 2020-02-22 DIAGNOSIS — E11.9 TYPE 2 DIABETES MELLITUS WITHOUT COMPLICATION, WITHOUT LONG-TERM CURRENT USE OF INSULIN (HCC): ICD-10-CM

## 2020-02-22 LAB
CREAT UR-SCNC: 17 MG/DL
EST. AVERAGE GLUCOSE BLD GHB EST-MCNC: 151 MG/DL (ref 68–126)
HBA1C MFR BLD HPLC: 6.9 % (ref ?–5.7)
MICROALBUMIN UR-MCNC: <0.5 MG/DL

## 2020-02-22 PROCEDURE — 83036 HEMOGLOBIN GLYCOSYLATED A1C: CPT

## 2020-02-22 PROCEDURE — 36415 COLL VENOUS BLD VENIPUNCTURE: CPT

## 2020-02-22 PROCEDURE — 82043 UR ALBUMIN QUANTITATIVE: CPT

## 2020-02-22 PROCEDURE — 82570 ASSAY OF URINE CREATININE: CPT

## 2020-02-29 ENCOUNTER — OFFICE VISIT (OUTPATIENT)
Dept: FAMILY MEDICINE CLINIC | Facility: CLINIC | Age: 35
End: 2020-02-29

## 2020-02-29 VITALS
HEIGHT: 71 IN | HEART RATE: 78 BPM | WEIGHT: 224 LBS | DIASTOLIC BLOOD PRESSURE: 78 MMHG | BODY MASS INDEX: 31.36 KG/M2 | SYSTOLIC BLOOD PRESSURE: 128 MMHG | TEMPERATURE: 99 F

## 2020-02-29 DIAGNOSIS — E11.9 TYPE 2 DIABETES MELLITUS WITHOUT COMPLICATION, WITHOUT LONG-TERM CURRENT USE OF INSULIN (HCC): Primary | ICD-10-CM

## 2020-02-29 PROCEDURE — 99213 OFFICE O/P EST LOW 20 MIN: CPT | Performed by: FAMILY MEDICINE

## 2020-02-29 NOTE — PROGRESS NOTES
2/29/2020  9:42 AM    Howard Alvarado is a 29year old male. Chief complaint(s): Patient presents with:  Abnormal Labs: x HgbA1c test lab result f/u    HPI:     Howard Alvarado primary complaint is regarding diabetes.      Patient Cory Doshi a 29 Smoking status: Former Smoker        Packs/day: 0.25        Types: Cigarettes      Smokeless tobacco: Former User    Alcohol use: No      Alcohol/week: 0.0 standard drinks      Comment: quit 4 years ago    Drug use: No       Immunizations:     Immunizat Brandon Barney   Results for orders placed or performed in visit on 02/22/20   HEMOGLOBIN A1C   Result Value Ref Range    HgbA1C 6.9 (H) <5.7 %    Estimated Average Glucose 151 (H) 68 - 126 mg/dL   MICROALB/CREAT RATIO, RANDOM URINE This Visit:  No orders of the defined types were placed in this encounter.       Meds This Visit:  Requested Prescriptions      No prescriptions requested or ordered in this encounter       Imaging & Referrals:  OPHTHALMOLOGY - INTERNAL         RANDOLPH MART

## 2020-03-02 NOTE — TELEPHONE ENCOUNTER
Diabetes Medications. Please advise on refill. Med is not active, however there is no mention in last OV note to stop medication. Please advise. Thanks.   Protocol Criteria:  · Appointment scheduled in the past 6 months or the next 3 months  · A1C < 7.5 in

## 2020-03-05 RX ORDER — SITAGLIPTIN AND METFORMIN HYDROCHLORIDE 50; 500 MG/1; MG/1
TABLET, FILM COATED ORAL
Qty: 180 TABLET | Refills: 1 | Status: SHIPPED | OUTPATIENT
Start: 2020-03-05 | End: 2020-08-27

## 2020-03-20 RX ORDER — AMLODIPINE AND OLMESARTAN MEDOXOMIL 10; 40 MG/1; MG/1
1 TABLET ORAL
Qty: 90 TABLET | Refills: 1 | Status: SHIPPED | OUTPATIENT
Start: 2020-03-20 | End: 2020-08-27

## 2020-03-20 RX ORDER — ATORVASTATIN CALCIUM 40 MG/1
TABLET, FILM COATED ORAL
Qty: 90 TABLET | Refills: 1 | Status: SHIPPED | OUTPATIENT
Start: 2020-03-20 | End: 2020-08-27

## 2020-03-20 NOTE — TELEPHONE ENCOUNTER
Please review; protocol failed. Pt is overdue for labs.   Cholesterol Medications  Protocol Criteria:  · Appointment scheduled in the past 12 months or in the next 3 months  · ALT & LDL on file in the past 12 months  · ALT result < 80  · LDL result <130 mellitus without complication, without long-term current use of insulin [facility])    Jeanette Leyden, Addison Cynda Mattocks, MD    Office Visit    1 year ago Type 2 diabetes mellitus without complication, without long-term current use of insul

## 2020-08-27 RX ORDER — SITAGLIPTIN AND METFORMIN HYDROCHLORIDE 50; 500 MG/1; MG/1
TABLET, FILM COATED ORAL
Qty: 180 TABLET | Refills: 1 | Status: SHIPPED | OUTPATIENT
Start: 2020-08-27 | End: 2021-02-23

## 2020-08-27 RX ORDER — AMLODIPINE AND OLMESARTAN MEDOXOMIL 10; 40 MG/1; MG/1
TABLET ORAL
Qty: 90 TABLET | Refills: 1 | Status: SHIPPED | OUTPATIENT
Start: 2020-08-27 | End: 2021-02-23

## 2020-08-28 RX ORDER — ATORVASTATIN CALCIUM 40 MG/1
TABLET, FILM COATED ORAL
Qty: 90 TABLET | Refills: 1 | Status: SHIPPED | OUTPATIENT
Start: 2020-08-28 | End: 2021-10-04

## 2020-10-14 ENCOUNTER — TELEPHONE (OUTPATIENT)
Dept: CASE MANAGEMENT | Age: 35
End: 2020-10-14

## 2020-10-14 NOTE — TELEPHONE ENCOUNTER
Patient is due for DM eye exam. Referral in chart to Dr Jay Lozano. Left message to call back [phone].

## 2020-11-13 ENCOUNTER — TELEPHONE (OUTPATIENT)
Dept: CASE MANAGEMENT | Age: 35
End: 2020-11-13

## 2021-02-23 RX ORDER — SITAGLIPTIN AND METFORMIN HYDROCHLORIDE 50; 500 MG/1; MG/1
TABLET, FILM COATED ORAL
Qty: 180 TABLET | Refills: 1 | Status: SHIPPED | OUTPATIENT
Start: 2021-02-23 | End: 2021-10-04

## 2021-02-23 RX ORDER — AMLODIPINE AND OLMESARTAN MEDOXOMIL 10; 40 MG/1; MG/1
TABLET ORAL
Qty: 90 TABLET | Refills: 1 | Status: SHIPPED | OUTPATIENT
Start: 2021-02-23 | End: 2021-10-01

## 2021-04-16 ENCOUNTER — IMMUNIZATION (OUTPATIENT)
Dept: LAB | Age: 36
End: 2021-04-16
Attending: HOSPITALIST
Payer: COMMERCIAL

## 2021-04-16 DIAGNOSIS — Z23 NEED FOR VACCINATION: Primary | ICD-10-CM

## 2021-04-16 PROCEDURE — 0001A SARSCOV2 VAC 30MCG/0.3ML IM: CPT

## 2021-05-10 ENCOUNTER — IMMUNIZATION (OUTPATIENT)
Dept: LAB | Age: 36
End: 2021-05-10
Attending: HOSPITALIST
Payer: COMMERCIAL

## 2021-05-10 DIAGNOSIS — Z23 NEED FOR VACCINATION: Primary | ICD-10-CM

## 2021-05-10 PROCEDURE — 0002A SARSCOV2 VAC 30MCG/0.3ML IM: CPT

## 2021-05-12 ENCOUNTER — TELEPHONE (OUTPATIENT)
Dept: FAMILY MEDICINE CLINIC | Facility: CLINIC | Age: 36
End: 2021-05-12

## 2021-06-22 NOTE — TELEPHONE ENCOUNTER
2nd attempt/left voice mail message for pt to call back. When the patient returns the call.  Please, assist in scheduling an appointment per the below request.

## 2021-07-09 ENCOUNTER — TELEPHONE (OUTPATIENT)
Dept: FAMILY MEDICINE CLINIC | Facility: CLINIC | Age: 36
End: 2021-07-09

## 2021-07-09 NOTE — TELEPHONE ENCOUNTER
Pt is due for physical exam w/PCP, DM eye exam and labs. Patient notified and states that he will call back.

## 2021-08-02 ENCOUNTER — TELEPHONE (OUTPATIENT)
Dept: CASE MANAGEMENT | Age: 36
End: 2021-08-02

## 2021-08-02 NOTE — TELEPHONE ENCOUNTER
Pt is due for physical exam w/PCP, DM eye exam and labs. Detailed message left on patients voicemail.

## 2021-09-13 RX ORDER — ATORVASTATIN CALCIUM 40 MG/1
TABLET, FILM COATED ORAL
Qty: 90 TABLET | Refills: 1 | OUTPATIENT
Start: 2021-09-13

## 2021-09-13 RX ORDER — AMLODIPINE AND OLMESARTAN MEDOXOMIL 10; 40 MG/1; MG/1
TABLET ORAL
Qty: 90 TABLET | Refills: 1 | OUTPATIENT
Start: 2021-09-13

## 2021-09-13 RX ORDER — SITAGLIPTIN AND METFORMIN HYDROCHLORIDE 500; 50 MG/1; MG/1
TABLET, FILM COATED ORAL
Qty: 180 TABLET | Refills: 1 | OUTPATIENT
Start: 2021-09-13

## 2021-09-27 RX ORDER — AMLODIPINE AND OLMESARTAN MEDOXOMIL 10; 40 MG/1; MG/1
1 TABLET ORAL DAILY
Qty: 90 TABLET | Refills: 1 | OUTPATIENT
Start: 2021-09-27

## 2021-09-27 RX ORDER — SITAGLIPTIN AND METFORMIN HYDROCHLORIDE 500; 50 MG/1; MG/1
1 TABLET, FILM COATED ORAL 2 TIMES DAILY WITH MEALS
Qty: 180 TABLET | Refills: 1 | OUTPATIENT
Start: 2021-09-27

## 2021-09-27 RX ORDER — ATORVASTATIN CALCIUM 40 MG/1
TABLET, FILM COATED ORAL
Qty: 90 TABLET | Refills: 1 | OUTPATIENT
Start: 2021-09-27

## 2021-10-02 ENCOUNTER — PATIENT MESSAGE (OUTPATIENT)
Dept: FAMILY MEDICINE CLINIC | Facility: CLINIC | Age: 36
End: 2021-10-02

## 2021-10-02 RX ORDER — AMLODIPINE AND OLMESARTAN MEDOXOMIL 10; 40 MG/1; MG/1
1 TABLET ORAL DAILY
Qty: 10 TABLET | Refills: 0 | Status: SHIPPED | OUTPATIENT
Start: 2021-10-02 | End: 2021-10-04

## 2021-10-02 NOTE — TELEPHONE ENCOUNTER
Please review. Protocol failed/ No protocol. Requested Prescriptions   Pending Prescriptions Disp Refills    amLODIPine-Olmesartan 10-40 MG Oral Tab 90 tablet 1     Sig: Take 1 tablet by mouth daily.         Hypertensive Medications Protocol Failed - 10/1/2021  7:33 PM        Failed - CMP or BMP in past 12 months        Failed - Appointment in past 6 or next 3 months        Passed - GFR Non- > 50     Lab Results   Component Value Date    GFRNAA >60 03/26/2018                       Recent Outpatient Visits              1 year ago Type 2 diabetes mellitus without complication, without long-term current use of insulin [facility])    [facility] Kenny , Manny Lopez MD    Office Visit    2 years ago Type 2 diabetes mellitus without complication, without long-term current use of insulin [facility]    Office Visit    2 years ago Type 2 diabetes mellitus without complication, without long-term current use of insulin [facility]    Office Visit    3 years ago Type 2 diabetes mellitus without complication, without long-term current use of insulin [facility]    Office Visit    3 years ago Type 2 diabetes mellitus without complication, without long-term current use of insulin [facility]    Office Visit

## 2021-10-04 RX ORDER — SITAGLIPTIN AND METFORMIN HYDROCHLORIDE 50; 500 MG/1; MG/1
1 TABLET, FILM COATED ORAL 2 TIMES DAILY WITH MEALS
Qty: 20 TABLET | Refills: 0 | Status: SHIPPED | OUTPATIENT
Start: 2021-10-04 | End: 2021-11-12

## 2021-10-04 RX ORDER — ATORVASTATIN CALCIUM 40 MG/1
TABLET, FILM COATED ORAL
Qty: 10 TABLET | Refills: 0 | Status: SHIPPED | OUTPATIENT
Start: 2021-10-04 | End: 2021-11-12

## 2021-10-04 RX ORDER — AMLODIPINE AND OLMESARTAN MEDOXOMIL 10; 40 MG/1; MG/1
1 TABLET ORAL DAILY
Qty: 10 TABLET | Refills: 0 | Status: SHIPPED | OUTPATIENT
Start: 2021-10-04 | End: 2021-11-12

## 2021-11-12 ENCOUNTER — LAB ENCOUNTER (OUTPATIENT)
Dept: LAB | Age: 36
End: 2021-11-12
Attending: FAMILY MEDICINE
Payer: COMMERCIAL

## 2021-11-12 ENCOUNTER — OFFICE VISIT (OUTPATIENT)
Dept: FAMILY MEDICINE CLINIC | Facility: CLINIC | Age: 36
End: 2021-11-12

## 2021-11-12 VITALS
WEIGHT: 227.19 LBS | DIASTOLIC BLOOD PRESSURE: 78 MMHG | HEIGHT: 71 IN | BODY MASS INDEX: 31.81 KG/M2 | HEART RATE: 79 BPM | SYSTOLIC BLOOD PRESSURE: 139 MMHG

## 2021-11-12 DIAGNOSIS — I10 PRIMARY HYPERTENSION: ICD-10-CM

## 2021-11-12 DIAGNOSIS — E78.00 PURE HYPERCHOLESTEROLEMIA: ICD-10-CM

## 2021-11-12 DIAGNOSIS — E11.9 TYPE 2 DIABETES MELLITUS WITHOUT COMPLICATION, WITHOUT LONG-TERM CURRENT USE OF INSULIN (HCC): ICD-10-CM

## 2021-11-12 DIAGNOSIS — E11.9 TYPE 2 DIABETES MELLITUS WITHOUT COMPLICATION, WITHOUT LONG-TERM CURRENT USE OF INSULIN (HCC): Primary | ICD-10-CM

## 2021-11-12 PROCEDURE — 3008F BODY MASS INDEX DOCD: CPT | Performed by: FAMILY MEDICINE

## 2021-11-12 PROCEDURE — 3075F SYST BP GE 130 - 139MM HG: CPT | Performed by: FAMILY MEDICINE

## 2021-11-12 PROCEDURE — 90686 IIV4 VACC NO PRSV 0.5 ML IM: CPT | Performed by: FAMILY MEDICINE

## 2021-11-12 PROCEDURE — 80053 COMPREHEN METABOLIC PANEL: CPT

## 2021-11-12 PROCEDURE — 82043 UR ALBUMIN QUANTITATIVE: CPT

## 2021-11-12 PROCEDURE — 99214 OFFICE O/P EST MOD 30 MIN: CPT | Performed by: FAMILY MEDICINE

## 2021-11-12 PROCEDURE — 82570 ASSAY OF URINE CREATININE: CPT

## 2021-11-12 PROCEDURE — 90471 IMMUNIZATION ADMIN: CPT | Performed by: FAMILY MEDICINE

## 2021-11-12 PROCEDURE — 3051F HG A1C>EQUAL 7.0%<8.0%: CPT | Performed by: FAMILY MEDICINE

## 2021-11-12 PROCEDURE — 3078F DIAST BP <80 MM HG: CPT | Performed by: FAMILY MEDICINE

## 2021-11-12 PROCEDURE — 83036 HEMOGLOBIN GLYCOSYLATED A1C: CPT | Performed by: FAMILY MEDICINE

## 2021-11-12 PROCEDURE — 36415 COLL VENOUS BLD VENIPUNCTURE: CPT

## 2021-11-12 PROCEDURE — 80061 LIPID PANEL: CPT

## 2021-11-12 RX ORDER — ATORVASTATIN CALCIUM 40 MG/1
TABLET, FILM COATED ORAL
Qty: 90 TABLET | Refills: 1 | Status: SHIPPED | OUTPATIENT
Start: 2021-11-12

## 2021-11-12 RX ORDER — AMLODIPINE AND OLMESARTAN MEDOXOMIL 10; 40 MG/1; MG/1
1 TABLET ORAL DAILY
Qty: 90 TABLET | Refills: 1 | Status: SHIPPED | OUTPATIENT
Start: 2021-11-12 | End: 2022-02-03

## 2021-11-12 RX ORDER — SITAGLIPTIN AND METFORMIN HYDROCHLORIDE 50; 500 MG/1; MG/1
1 TABLET, FILM COATED ORAL 2 TIMES DAILY WITH MEALS
Qty: 180 TABLET | Refills: 1 | Status: SHIPPED | OUTPATIENT
Start: 2021-11-12

## 2021-11-12 NOTE — PROGRESS NOTES
11/12/2021  8:47 AM    Karen Luciano is a 39year old male. Chief complaint(s): Patient presents with:  Diabetes: F/U  HTN  Cholesterol    HPI:     Karen Luciano primary complaint is regarding as above.      Patient Khushboo Schrader a 39year old m experiencing any hypercholesterolemia related symptoms. Average lipid levels with medication run slightly High per patient report. Most recent lab tests include total cholesterol level ? Keenan Cowan Patient also has a history of Diabetes.         HISTORY:  Past Med CONTOUR NEXT TEST) In Vitro Strip Test blood sugar three times per day.  100 strip 3   • MICROLET LANCETS Does not apply Misc TEST BLOOD SUGAR THREE TIMES DAILY 100 each 3       Allergies:    Seasonal                OTHER (SEE COMMENTS)    Comment:Watery ey Result Value Ref Range    Microalbumin, Urine <0.50 mg/dL    Creatinine Ur Random 17.00 mg/dL    Malb/Cre Calc         EKG / Spirometry : -     Radiology: No results found.      ASSESSMENT/PLAN:   Assessment   Type 2 diabetes mellitus without complication training in administering insulin injections, adherence to an 1800 calorie ADA diet,  5 pound weight loss, a graduated exercise program, HgbA1C level checked quarterly, daily foot self-inspection, need for yearly flu shots, and avoid all sodas, juices, can reduction. FOLLOW-UP: Schedule a follow-up visit in 6 months.         3. Pure hypercholesterolemia   Hyperlipidemia     MEDICATIONS:   Requested Prescriptions     Signed Prescriptions Disp Refills   • amLODIPine-Olmesartan 10-40 MG Oral Tab 90 tablet 1 & Referrals:  Afsaneh Huddle QUAD PRESERVATIVE FREE 0.5 ML         Melinda Barreto MD

## 2022-02-03 DIAGNOSIS — I10 PRIMARY HYPERTENSION: ICD-10-CM

## 2022-02-04 RX ORDER — AMLODIPINE AND OLMESARTAN MEDOXOMIL 10; 40 MG/1; MG/1
1 TABLET ORAL DAILY
Qty: 90 TABLET | Refills: 1 | Status: SHIPPED | OUTPATIENT
Start: 2022-02-04 | End: 2022-07-26

## 2022-02-04 NOTE — TELEPHONE ENCOUNTER
Refill passed per [facility] protocol. Requested Prescriptions   Pending Prescriptions Disp Refills    amLODIPine-Olmesartan 10-40 MG Oral Tab 90 tablet 1     Sig: Take 1 tablet by mouth daily.         Hypertensive Medications Protocol Passed - 2/3/2022  7:17 PM        Passed - CMP or BMP in past 12 months        Passed - Appointment in past 6 or next 3 months        Passed - GFR Non- > 50     Lab Results   Component Value Date    [facility] 114 11/12/2021                       Future Appointments         Provider Department Appt Notes    In 3 weeks Asher Hemphill MD [place] [facility], Manny Hebert Abnormal results include: LFTS            Recent Outpatient Visits              2 months ago Type 2 diabetes mellitus without complication, without long-term current use of insulin [facility])    [place] [facility]    Office Visit    1 year ago Type 2 diabetes mellitus without complication, without long-term current use of insulin [facility])    [place] [facility], Manny Hebert MD    Office Visit    2 years ago Type 2 diabetes mellitus without complication, without long-term current use of insulin [facility])    [place] [facility], Manny Hebert MD    Office Visit    3 years ago Type 2 diabetes mellitus without complication, without long-term current use of insulin [facility])    [place] [facility], Manny Hebert MD    Office Visit    3 years ago Type 2 diabetes mellitus without complication, without long-term current use of insulin [facility])    [place] [facility]    Office Visit

## 2022-02-26 ENCOUNTER — OFFICE VISIT (OUTPATIENT)
Dept: FAMILY MEDICINE CLINIC | Facility: CLINIC | Age: 37
End: 2022-02-26
Payer: COMMERCIAL

## 2022-02-26 VITALS
HEIGHT: 71 IN | HEART RATE: 105 BPM | DIASTOLIC BLOOD PRESSURE: 82 MMHG | BODY MASS INDEX: 32.23 KG/M2 | WEIGHT: 230.19 LBS | SYSTOLIC BLOOD PRESSURE: 168 MMHG

## 2022-02-26 DIAGNOSIS — I10 PRIMARY HYPERTENSION: ICD-10-CM

## 2022-02-26 DIAGNOSIS — E11.9 TYPE 2 DIABETES MELLITUS WITHOUT COMPLICATION, WITHOUT LONG-TERM CURRENT USE OF INSULIN (HCC): Primary | ICD-10-CM

## 2022-02-26 DIAGNOSIS — R79.89 ELEVATED LFTS: ICD-10-CM

## 2022-02-26 PROCEDURE — 3008F BODY MASS INDEX DOCD: CPT | Performed by: FAMILY MEDICINE

## 2022-02-26 PROCEDURE — 3077F SYST BP >= 140 MM HG: CPT | Performed by: FAMILY MEDICINE

## 2022-02-26 PROCEDURE — 99213 OFFICE O/P EST LOW 20 MIN: CPT | Performed by: FAMILY MEDICINE

## 2022-02-26 PROCEDURE — 3079F DIAST BP 80-89 MM HG: CPT | Performed by: FAMILY MEDICINE

## 2022-02-26 RX ORDER — CHLORTHALIDONE 25 MG/1
25 TABLET ORAL DAILY
Qty: 30 TABLET | Refills: 3 | Status: SHIPPED | OUTPATIENT
Start: 2022-02-26

## 2022-05-01 RX ORDER — CHLORTHALIDONE 25 MG/1
25 TABLET ORAL DAILY
Qty: 90 TABLET | Refills: 1 | Status: SHIPPED | OUTPATIENT
Start: 2022-05-01 | End: 2022-07-26

## 2022-05-01 NOTE — TELEPHONE ENCOUNTER
Refill passed per [address] [place] protocol. Requested Prescriptions   Pending Prescriptions Disp Refills    chlorthalidone 25 MG Oral Tab 30 tablet 3     Sig: Take 1 tablet (25 mg total) by mouth daily.         Hypertensive Medications Protocol Passed - 4/30/2022  1:30 PM        Passed - CMP or BMP in past 12 months        Passed - Appointment in past 6 or next 3 months        Passed - GFR Non- > 50     Lab Results   Component Value Date    GFRNAA 114 11/12/2021                       Recent Outpatient Visits              2 months ago Type 2 diabetes mellitus without complication, without long-term current use of insulin [facility])    [address], Manny Machado MD    Office Visit    5 months ago Type 2 diabetes mellitus without complication, without long-term current use of insulin [facility])    [address], Manny Machado MD    Office Visit    2 years ago Type 2 diabetes mellitus without complication, without long-term current use of insulin [facility])    [address], Manny Machado MD    Office Visit    2 years ago Type 2 diabetes mellitus without complication, without long-term current use of insulin [facility])    [address], Manny Machado MD    Office Visit    3 years ago Type 2 diabetes mellitus without complication, without long-term current use of insulin [facility])    [address], Manny Machado MD    Office Visit

## 2022-07-12 ENCOUNTER — TELEPHONE (OUTPATIENT)
Dept: CASE MANAGEMENT | Age: 37
End: 2022-07-12

## 2022-07-26 DIAGNOSIS — I10 PRIMARY HYPERTENSION: ICD-10-CM

## 2022-07-26 RX ORDER — CHLORTHALIDONE 25 MG/1
25 TABLET ORAL DAILY
Qty: 90 TABLET | Refills: 1 | Status: SHIPPED | OUTPATIENT
Start: 2022-07-26

## 2022-07-26 RX ORDER — AMLODIPINE AND OLMESARTAN MEDOXOMIL 10; 40 MG/1; MG/1
1 TABLET ORAL DAILY
Qty: 90 TABLET | Refills: 1 | Status: SHIPPED | OUTPATIENT
Start: 2022-07-26

## 2022-09-14 DIAGNOSIS — E78.00 PURE HYPERCHOLESTEROLEMIA: ICD-10-CM

## 2022-09-15 RX ORDER — ATORVASTATIN CALCIUM 40 MG/1
TABLET, FILM COATED ORAL
Qty: 90 TABLET | Refills: 1 | Status: SHIPPED | OUTPATIENT
Start: 2022-09-15

## 2022-09-15 NOTE — TELEPHONE ENCOUNTER
Please review refill failed/no protocol     Requested Prescriptions     Pending Prescriptions Disp Refills    atorvastatin 40 MG Oral Tab 90 tablet 1     Sig: TAKE 1 TABLET BY MOUTH EVERY NIGHT AT BEDTIME         Recent Visits  Date Type Provider Dept   02/26/22 [facility]   [facility]   Showing recent visits within past 540 days with a meds authorizing provider and meeting all other requirements  Future Appointments  No visits were found meeting these conditions.   Showing future appointments within next 150 days with a meds authorizing provider and meeting all other requirements    Requested Prescriptions   Pending Prescriptions Disp Refills    atorvastatin 40 MG Oral Tab 90 tablet 1     Sig: TAKE 1 TABLET BY MOUTH EVERY NIGHT AT BEDTIME        Cholesterol Medication Protocol Failed - 9/14/2022 10:42 PM        Failed - Last ALT < 80       Lab Results   Component Value Date     (H) 11/12/2021             Passed - ALT in past 12 months        Passed - LDL in past 12 months        Passed - Last LDL < 130     Lab Results   Component Value Date    LDL 87 11/12/2021               Passed - In person appointment or virtual visit in the past 12 mos or appointment in next 3 mos       Recent Outpatient Visits              6 months ago Type 2 diabetes mellitus without complication, without long-term current use of insulin [facility])    [facility] Kenny Romero, [place] Tessie Babinski, MD    Office Visit    10 months ago Type 2 diabetes mellitus without complication, without long-term current use of insulin [facility])    [place] [facility] [facility], Kenny Romero, Addison Tessie Babinski, MD    Office Visit    2 years ago Type 2 diabetes mellitus without complication, without long-term current use of insulin [facility])    [place] [facility], Kenny oRmero, Frances Mcardle, MD    Office Visit    3 years ago Type 2 diabetes mellitus without complication, without long-term current use of [facility])    [facility], Kenny Romero, Manny Cross MD    Office Visit    3 years ago Type 2 diabetes mellitus without complication, without long-term current [facility]    [facility], Kenny Romero, Manny Cross MD    Office Visit                       Recent Outpatient Visits              6 months ago Type 2 diabetes mellitus without complication, without long-term current use [facility]    [facility], Kenny Romero, Manny Cross MD    Office Visit    10 months ago Type 2 diabetes mellitus without complication, without long-term current use of [facility]    [facility], Kenny Romero, Manny Cross MD    Office Visit    2 years ago Type 2 diabetes mellitus without complication, without long-term current use [facility]    [facility], Kenny Romero, Manny Cross MD    Office Visit    3 years ago Type 2 diabetes mellitus without complication, without long-term current use of insulin [facility])    [facility], Kenny Romero, Manny Cross MD    Office Visit    3 years ago Type 2 diabetes mellitus without complication, without long-term current use of [facility]    [facility] Kenny Romero, Apurva Reardon MD    Office Visit

## 2022-10-06 ENCOUNTER — TELEPHONE (OUTPATIENT)
Dept: FAMILY MEDICINE CLINIC | Facility: CLINIC | Age: 37
End: 2022-10-06

## 2022-10-06 NOTE — TELEPHONE ENCOUNTER
Patient is due for DM follow up w/PCP and fasting labs. Appt scheduled for 11/19/22. Please order fasting labs and urine micro/creat so that pt can do them prior to appt. Thank you.

## 2022-11-17 ENCOUNTER — LAB ENCOUNTER (OUTPATIENT)
Dept: LAB | Age: 37
End: 2022-11-17
Attending: FAMILY MEDICINE
Payer: COMMERCIAL

## 2022-11-17 DIAGNOSIS — R79.89 ELEVATED LFTS: ICD-10-CM

## 2022-11-17 LAB
ALT SERPL-CCNC: 82 U/L
AST SERPL-CCNC: 114 U/L (ref 15–37)

## 2022-11-17 PROCEDURE — 36415 COLL VENOUS BLD VENIPUNCTURE: CPT

## 2022-11-17 PROCEDURE — 84460 ALANINE AMINO (ALT) (SGPT): CPT

## 2022-11-17 PROCEDURE — 84450 TRANSFERASE (AST) (SGOT): CPT

## 2022-11-19 ENCOUNTER — OFFICE VISIT (OUTPATIENT)
Dept: FAMILY MEDICINE CLINIC | Facility: CLINIC | Age: 37
End: 2022-11-19
Payer: COMMERCIAL

## 2022-11-19 VITALS
HEIGHT: 71 IN | SYSTOLIC BLOOD PRESSURE: 122 MMHG | HEART RATE: 85 BPM | DIASTOLIC BLOOD PRESSURE: 60 MMHG | WEIGHT: 215.38 LBS | BODY MASS INDEX: 30.15 KG/M2

## 2022-11-19 DIAGNOSIS — I10 PRIMARY HYPERTENSION: ICD-10-CM

## 2022-11-19 DIAGNOSIS — E11.9 TYPE 2 DIABETES MELLITUS WITHOUT COMPLICATION, WITHOUT LONG-TERM CURRENT USE OF INSULIN (HCC): Primary | ICD-10-CM

## 2022-11-19 DIAGNOSIS — R79.89 ELEVATED LFTS: ICD-10-CM

## 2022-11-19 DIAGNOSIS — E78.00 PURE HYPERCHOLESTEROLEMIA: ICD-10-CM

## 2022-11-19 LAB
CARTRIDGE EXPIRATION DATE: ABNORMAL DATE
CARTRIDGE LOT#: ABNORMAL NUMERIC
HEMOGLOBIN A1C: 6.5 % (ref 4.3–5.6)

## 2022-11-19 PROCEDURE — 3008F BODY MASS INDEX DOCD: CPT | Performed by: FAMILY MEDICINE

## 2022-11-19 PROCEDURE — 3044F HG A1C LEVEL LT 7.0%: CPT | Performed by: FAMILY MEDICINE

## 2022-11-19 PROCEDURE — 3078F DIAST BP <80 MM HG: CPT | Performed by: FAMILY MEDICINE

## 2022-11-19 PROCEDURE — 99214 OFFICE O/P EST MOD 30 MIN: CPT | Performed by: FAMILY MEDICINE

## 2022-11-19 PROCEDURE — 3074F SYST BP LT 130 MM HG: CPT | Performed by: FAMILY MEDICINE

## 2022-11-19 PROCEDURE — 90686 IIV4 VACC NO PRSV 0.5 ML IM: CPT | Performed by: FAMILY MEDICINE

## 2022-11-19 PROCEDURE — 90471 IMMUNIZATION ADMIN: CPT | Performed by: FAMILY MEDICINE

## 2022-11-19 PROCEDURE — 83036 HEMOGLOBIN GLYCOSYLATED A1C: CPT | Performed by: FAMILY MEDICINE

## 2023-01-03 DIAGNOSIS — I10 PRIMARY HYPERTENSION: ICD-10-CM

## 2023-01-04 RX ORDER — AMLODIPINE AND OLMESARTAN MEDOXOMIL 10; 40 MG/1; MG/1
1 TABLET ORAL DAILY
Qty: 90 TABLET | Refills: 1 | Status: SHIPPED | OUTPATIENT
Start: 2023-01-04

## 2023-01-04 RX ORDER — CHLORTHALIDONE 25 MG/1
25 TABLET ORAL DAILY
Qty: 90 TABLET | Refills: 1 | Status: SHIPPED | OUTPATIENT
Start: 2023-01-04

## 2023-01-27 DIAGNOSIS — E11.9 TYPE 2 DIABETES MELLITUS WITHOUT COMPLICATION, WITHOUT LONG-TERM CURRENT USE OF INSULIN (HCC): ICD-10-CM

## 2023-01-27 RX ORDER — SITAGLIPTIN AND METFORMIN HYDROCHLORIDE 500; 50 MG/1; MG/1
TABLET, FILM COATED ORAL
Qty: 180 TABLET | Refills: 1 | OUTPATIENT
Start: 2023-01-27

## 2023-01-27 RX ORDER — SITAGLIPTIN AND METFORMIN HYDROCHLORIDE 500; 50 MG/1; MG/1
1 TABLET, FILM COATED ORAL 2 TIMES DAILY WITH MEALS
Qty: 180 TABLET | Refills: 1 | OUTPATIENT
Start: 2023-01-27

## 2023-01-28 RX ORDER — SITAGLIPTIN AND METFORMIN HYDROCHLORIDE 500; 50 MG/1; MG/1
1 TABLET, FILM COATED ORAL 2 TIMES DAILY WITH MEALS
Qty: 180 TABLET | Refills: 1 | Status: SHIPPED | OUTPATIENT
Start: 2023-01-28

## 2023-01-28 NOTE — TELEPHONE ENCOUNTER
Please review; protocol failed/no protocol. Requested Prescriptions   Pending Prescriptions Disp Refills    SITagliptin-metFORMIN HCl (JANUMET)  MG Oral Tab 180 tablet 1     Sig: Take 1 tablet by mouth 2 (two) times daily with meals.        Diabetes Medication Protocol Failed - 1/27/2023  5:50 PM        Failed - EGFRCR or GFRNAA > 50     GFR Evaluation            Failed - GFR in the past 12 months        Passed - Last A1C < 7.5 and within past 6 months     Lab Results   Component Value Date    A1C 6.5 (A) 11/19/2022             Passed - In person appointment or virtual visit in the past 6 mos or appointment in next 3 mos     Recent Outpatient Visits              2 months ago Type 2 diabetes mellitus without complication, without long-term current use of insulin [facility]    [address], [address] Romero, Dani Ceja MD    Office Visit    11 months ago Type 2 diabetes mellitus without complication, without long-term current use of insulin [facility])    [address], Kenny Romero, Dani Ceja MD    Office Visit    1 year ago Type 2 diabetes mellitus without complication, without long-term current use of insulin [facility])    [address], [address] Romero, Dani Ceja MD    Office Visit    2 years ago Type 2 diabetes mellitus without complication, without long-term current use of insulin [facility])    [address], Kenny Romero, Dani Ceja MD    Office Visit    3 years ago Type 2 diabetes mellitus without complication, without long-term current use of insulin [facility])    [address], Kenny Romero, Dani Ceja MD    Office Visit          Future Appointments         Provider Department Appt Notes    In 3 months MD Laura Clay Addison f/u                     Recent Outpatient Visits              2 months ago Type 2 diabetes mellitus without complication, without long-term current use of insulin [facility])    [address], [address], Manny Springer MD    Office Visit    11 months ago Type 2 diabetes mellitus without complication, without long-term current use of insulin [facility])    [address], [address], Tash Blount MD    Office Visit    1 year ago Type 2 diabetes mellitus without complication, without long-term current use of insulin [facility])    [address], [address], Tash Blount MD    Office Visit    2 years ago Type 2 diabetes mellitus without complication, without long-term current use of insulin [facility])    Tash Cruz MD    Office Visit    3 years ago Type 2 diabetes mellitus without complication, without long-term current use of insulin [facility])    [address], Kenny Romero, Tash Blount MD    Office Visit             Future Appointments         Provider Department Appt Notes    In 3 months MD Miah Rodrigues Addison f/u

## 2023-03-22 DIAGNOSIS — I10 PRIMARY HYPERTENSION: ICD-10-CM

## 2023-03-23 NOTE — TELEPHONE ENCOUNTER
Please review. Protocol failed / No protocol. (Sent to covering provider)  Requested Prescriptions   Pending Prescriptions Disp Refills    amLODIPine-Olmesartan 10-40 MG Oral Tab 90 tablet 1     Sig: Take 1 tablet by mouth daily. Hypertensive Medications Protocol Failed - 3/22/2023  2:29 PM        Failed - CMP or BMP in past 6 months     No results found for this or any previous visit (from the past 4392 hour(s)).             Failed - EGFRCR or GFRNAA > 50     GFR Evaluation            Passed - In person appointment in the past 12 or next 3 months     Recent Outpatient Visits              4 months ago Type 2 diabetes mellitus without complication, without long-term current use of insulin ([facility] [address]    [address]enny Rojas, Roselyn Habermann, MD    Office Visit    1 year ago Type 2 diabetes mellitus without complication, without long-term current use of insulin [facility])    Kenny Rojas, Roselyn Habermann, MD    Office Visit    1 year ago Type 2 diabetes mellitus without complication, without long-term current use of insulin [facility])    [address] Kohl, [address], Roselyn Habermann, MD    Office Visit    3 years ago Type 2 diabetes mellitus without complication, without long-term current use of insulin [facility])    [address] Kohl, [address], Roselyn Habermann, MD    Office Visit    3 years ago Type 2 diabetes mellitus without complication, without long-term current use of insulin [facility])    [address], [address], Roselyn Habermann, MD    Office Visit          Future Appointments         Provider Department Appt Notes    In 2 months Rosales Dejesus MD [address] [facility], Manny f/u               Passed - Last BP reading less than 140/90     BP Readings from Last 1 Encounters:  11/19/22 : 122/60              Passed - In person appointment or virtual visit in the past 6 months     Recent Outpatient Visits              4 months ago Type 2 diabetes mellitus without complication, without long-term current use of insulin [facility]    Kenny Cabrera, Manny Lopez MD    Office Visit    1 year ago Type 2 diabetes mellitus without complication, without long-term current use of insulin [facility])    Kenny Cabrera, Fredi Conway MD    Office Visit    1 year ago Type 2 diabetes mellitus without complication, without long-term current use of insulin [facility])    Kenny Cabrera, Fredi Conway MD    Office Visit    3 years ago Type 2 diabetes mellitus without complication, without long-term current use of insulin [facility])    Kenny Cabrera, Fredi Conway MD    Office Visit    3 years ago Type 2 diabetes mellitus without complication, without long-term current use of insulin [facility])    Kenny Cabrera, Fredi Conway MD    Office Visit          Future Appointments         Provider Department Appt Notes    In 2 months Alexia Lopez MD [address] f/u                   Recent Outpatient Visits              4 months ago Type 2 diabetes mellitus without complication, without long-term current use of insulin [facility])    Kenny Cabrera, Fredi Conway MD    Office Visit    1 year ago Type 2 diabetes mellitus without complication, without long-term current use of insulin [facility])    [address], MD    Office Visit    1 year ago Type 2 diabetes mellitus without complication, without long-term current use of insulin [facility])    [address], MD    Office Visit    3 years ago Type 2 diabetes mellitus without complication, without long-term current use of insulin [facility])    [address], [address], Manny Panda MD    Office Visit    3 years ago Type 2 diabetes mellitus without complication, without long-term current use of insulin [facility])    [address], [address], Kelsi Caicedo MD    Office Visit           Future Appointments         Provider Department Appt Notes    In 2 months Mary Panda MD [address] [facility], Manny f/u

## 2023-03-24 RX ORDER — AMLODIPINE AND OLMESARTAN MEDOXOMIL 10; 40 MG/1; MG/1
1 TABLET ORAL DAILY
Qty: 30 TABLET | Refills: 0 | Status: SHIPPED | OUTPATIENT
Start: 2023-03-24

## 2023-07-31 ENCOUNTER — TELEPHONE (OUTPATIENT)
Dept: FAMILY MEDICINE CLINIC | Facility: CLINIC | Age: 38
End: 2023-07-31

## 2023-07-31 DIAGNOSIS — E11.9 TYPE 2 DIABETES MELLITUS WITHOUT COMPLICATION, WITHOUT LONG-TERM CURRENT USE OF INSULIN (HCC): ICD-10-CM

## 2023-08-01 RX ORDER — SITAGLIPTIN AND METFORMIN HYDROCHLORIDE 500; 50 MG/1; MG/1
1 TABLET, FILM COATED ORAL 2 TIMES DAILY WITH MEALS
Qty: 30 TABLET | Refills: 0 | Status: SHIPPED | OUTPATIENT
Start: 2023-08-01

## 2023-08-01 NOTE — TELEPHONE ENCOUNTER
Please review. Protocol failed / Has no protocol. [employer] message sent to patient to schedule an office visit with PCP. Will also make a phone attempt.     Requested Prescriptions   Pending Prescriptions Disp Refills    JANUMET  MG Oral Tab [Pharmacy Med Name: Sherri Tavera 50/500MG TABLETS] 180 tablet 1     Sig: TAKE 1 TABLET BY MOUTH TWICE DAILY WITH MEALS       Diabetes Medication Protocol Failed - 7/31/2023 11:45 AM        Failed - Last A1C < 7.5 and within past 6 months     Lab Results   Component Value Date    A1C 6.5 (A) 11/19/2022             Failed - In person appointment or virtual visit in the past 6 mos or appointment in next 3 mos     Recent Outpatient Visits              8 months ago Type 2 diabetes mellitus without complication, without long-term current use of insulin [facility]    [address], [address], Manny Scruggs MD    Office Visit    1 year ago Type 2 diabetes mellitus without complication, without long-term current use of insulin [facility])    [address], [address], Manny Scruggs MD    Office Visit    1 year ago Type 2 diabetes mellitus without complication, without long-term current use of insulin [facility])    [address], [address], Tavo Lawrence MD    Office Visit    3 years ago Type 2 diabetes mellitus without complication, without long-term current use of insulin [facility])    [address], [address], Tavo Lawrence MD    Office Visit    4 years ago Type 2 diabetes mellitus without complication, without long-term current use of insulin [facility])    [address], [address], Tavo Lawrence MD    Office Visit                      Failed - EGFRCR or GFRNAA > 50     GFR Evaluation            Failed - GFR in the past 12 months              Recent Outpatient Visits              8 months ago Type 2 diabetes mellitus without complication, without long-term current use of insulin [facility])    [address] [facility], Manny Ward MD    Office Visit    1 year ago Type 2 diabetes mellitus without complication, without long-term current use of insulin [facility])    [address] Adams, Burgess Bk MD    Office Visit    1 year ago Type 2 diabetes mellitus without complication, without long-term current use of insulin [facility])    [address]enny Adams, Burgess Bk MD    Office Visit    3 years ago Type 2 diabetes mellitus without complication, without long-term current use of insulin [facility])    [address], Burgess Bk MD    Office Visit    4 years ago Type 2 diabetes mellitus without complication, without long-term current use of insulin [facility])    [address], Burgess Bk MD    Office Visit

## 2023-08-04 DIAGNOSIS — I10 PRIMARY HYPERTENSION: ICD-10-CM

## 2023-08-06 RX ORDER — AMLODIPINE AND OLMESARTAN MEDOXOMIL 10; 40 MG/1; MG/1
1 TABLET ORAL DAILY
Qty: 90 TABLET | Refills: 0 | Status: SHIPPED | OUTPATIENT
Start: 2023-08-06

## 2023-08-06 RX ORDER — CHLORTHALIDONE 25 MG/1
25 TABLET ORAL DAILY
Qty: 90 TABLET | Refills: 0 | Status: SHIPPED | OUTPATIENT
Start: 2023-08-06

## 2023-08-06 RX ORDER — CHLORTHALIDONE 25 MG/1
25 TABLET ORAL DAILY
Qty: 30 TABLET | Refills: 0 | Status: SHIPPED | OUTPATIENT
Start: 2023-08-06

## 2023-08-06 NOTE — TELEPHONE ENCOUNTER
Please review. Protocol failed / No Protocol. Last 3 months of appointment patient canceled    Pend for 30/0    Requested Prescriptions   Pending Prescriptions Disp Refills    CHLORTHALIDONE 25 MG Oral Tab [Pharmacy Med Name: CHLORTHALIDONE 25MG TABLETS] 90 tablet 1     Sig: TAKE 1 TABLET(25 MG) BY MOUTH DAILY       Hypertensive Medications Protocol Failed - 8/4/2023  4:58 PM        Failed - CMP or BMP in past 6 months     No results found for this or any previous visit (from the past 4392 hour(s)).             Failed - In person appointment or virtual visit in the past 6 months     Recent Outpatient Visits              8 months ago Type 2 diabetes mellitus without complication, without long-term current use of insulin ([address]    [address]enny Horta Caswell Earnest, MD    Office Visit    1 year ago Type 2 diabetes mellitus without complication, without long-term current use of insulin [facility])    Kenny Horta Caswell Earnest, MD    Office Visit    1 year ago Type 2 diabetes mellitus without complication, without long-term current use of insulin [facility])    Kenny Horta Caswell Earnest, MD    Office Visit    3 years ago Type 2 diabetes mellitus without complication, without long-term current use of insulin [facility])    Suresh Pedraza MD    Office Visit    4 years ago Type 2 diabetes mellitus without complication, without long-term current use of insulin [facility])    [address] Caswell Earnest, MD    Office Visit                      Failed - EGFRCR or GFRNAA > 50     GFR Evaluation            Passed - In person appointment in the past 12 or next 3 months     Recent Outpatient Visits              8 months ago Type 2 diabetes mellitus without complication, without long-term current use of insulin ([address]    [facility], Kenny Romero, Manny Easley MD    Office Visit    1 year ago Type 2 diabetes mellitus without complication, without long-term current use of insulin [facility])    [employer], Kenny Romero, Manny Easley MD    Office Visit    1 year ago Type 2 diabetes mellitus without complication, without long-term current use of insulin [facility])    [employer] [address], Courtney Boast, MD    Office Visit    3 years ago Type 2 diabetes mellitus without complication, without long-term current use of insulin [facility])    [employer], Courtney Boast, MD    Office Visit    4 years ago Type 2 diabetes mellitus without complication, without long-term current use of insulin [facility])    [employer], Courtney Boast, MD    Office Visit                      Passed - Last BP reading less than 140/90     BP Readings from Last 1 Encounters:  11/19/22 : 122/60

## 2023-08-06 NOTE — TELEPHONE ENCOUNTER
Message sent to patient to schedule follow up visit. Please advise on refill request.      Requested Prescriptions     Pending Prescriptions Disp Refills    chlorthalidone 25 MG Oral Tab 90 tablet 1     Sig: Take 1 tablet (25 mg total) by mouth daily. amLODIPine-Olmesartan 10-40 MG Oral Tab 90 tablet 0     Sig: Take 1 tablet by mouth daily. Recent Visits  Date Type Provider Dept   11/19/22 Office Visit Екатерина Méndez MD [facility]   02/26/22 Office Visit Екатерина Méndez MD [facility]   Showing recent visits within past 540 days with a meds authorizing provider and meeting all other requirements  Future Appointments  No visits were found meeting these conditions. Showing future appointments within next 150 days with a meds authorizing provider and meeting all other requirements     Requested Prescriptions   Pending Prescriptions Disp Refills    chlorthalidone 25 MG Oral Tab 90 tablet 1     Sig: Take 1 tablet (25 mg total) by mouth daily. Hypertensive Medications Protocol Failed - 8/4/2023  6:16 PM        Failed - CMP or BMP in past 6 months     No results found for this or any previous visit (from the past 4392 hour(s)).             Failed - In person appointment or virtual visit in the past 6 months     Recent Outpatient Visits              8 months ago Type 2 diabetes mellitus without complication, without long-term current use of insulin ([facility]    [address], [address], Rosy Zavala MD    Office Visit    1 year ago Type 2 diabetes mellitus without complication, without long-term current use of insulin [facility])    [address] [facility], Rosy Zavala MD    Office Visit    1 year ago Type 2 diabetes mellitus without complication, without long-term current use of insulin [facility])    [address] [facility], Rosy Zavala MD    Office Visit    3 years ago Type 2 diabetes mellitus without complication, without long-term current use of insulin [facility])    [address], [address], Manny Rascon MD    Office Visit    4 years ago Type 2 diabetes mellitus without complication, without long-term current use of insulin [facility])    [address], [address], Manny Rascon MD    Office Visit                      Failed - EGFRCR or GFRNAA > 50     GFR Evaluation            Passed - In person appointment in the past 12 or next 3 months     Recent Outpatient Visits              8 months ago Type 2 diabetes mellitus without complication, without long-term current use of insulin [facility])    [address], [address], Marcella Mantilla MD    Office Visit    1 year ago Type 2 diabetes mellitus without complication, without long-term current use of insulin [facility])    [address], [address], Marcella Mantilla MD    Office Visit    1 year ago Type 2 diabetes mellitus without complication, without long-term current use of insulin [facility])    [address], [address], Marcella Mantilla MD    Office Visit    3 years ago Type 2 diabetes mellitus without complication, without long-term current use of insulin [facility])    [address], [address], Marcella Mantilla MD    Office Visit    4 years ago Type 2 diabetes mellitus without complication, without long-term current use of insulin [facility])    [address], [address], Marcella Mantilla MD    Office Visit                      Passed - Last BP reading less than 140/90     BP Readings from Last 1 Encounters:  11/19/22 : 122/60                amLODIPine-Olmesartan 10-40 MG Oral Tab 90 tablet 0     Sig: Take 1 tablet by mouth daily.        Hypertensive Medications Protocol Failed - 8/4/2023  6:16 PM        Failed - CMP or BMP in past 6 months     No results found for this or any previous visit (from the past 4392 hour(s)).             Failed - In person appointment or virtual visit in the past 6 months     Recent Outpatient Visits              8 months ago Type 2 diabetes mellitus without complication, without long-term current use of insulin ([facility]    [address], [address], Fredi Conway MD    Office Visit    1 year ago Type 2 diabetes mellitus without complication, without long-term current use of insulin [facility])    [address], [address], Fredi Conway MD    Office Visit    1 year ago Type 2 diabetes mellitus without complication, without long-term current use of insulin [facility])    [address], [address], Fredi Conway MD    Office Visit    3 years ago Type 2 diabetes mellitus without complication, without long-term current use of insulin [facility])    [address], [place] [address], Fredi Conway MD    Office Visit    4 years ago Type 2 diabetes mellitus without complication, without long-term current use of insulin [facility])    [address], [place] [address], Fredi Conway MD    Office Visit                      Failed - EGFRCR or GFRNAA > 50     GFR Evaluation            Passed - In person appointment in the past 12 or next 3 months     Recent Outpatient Visits              8 months ago Type 2 diabetes mellitus without complication, without long-term current use of insulin [facility])    [address] Fredi Diallo MD    Office Visit    1 year ago Type 2 diabetes mellitus without complication, without long-term current use of insulin [facility])    Fredi York MD    Office Visit    1 year ago Type 2 diabetes mellitus without complication, without long-term current use of insulin [facility])    Fredi York MD    Office Visit    3 years ago Type 2 diabetes mellitus without complication, without long-term current use of insulin [facility])    [address], [address], Manny Lopez MD    Office Visit    4 years ago Type 2 diabetes mellitus without complication, without long-term current use of insulin [facility])    [address], [address], Fredi Conway MD    Office Visit                      Passed - Last BP reading less than 140/90     BP Readings from Last 1 Encounters:  11/19/22 : 122/60                     Recent Outpatient Visits              8 months ago Type 2 diabetes mellitus without complication, without long-term current use of insulin ([address]    [address], [address], Fredi Conway MD    Office Visit    1 year ago Type 2 diabetes mellitus without complication, without long-term current use of insulin [facility])    [address], [address], Fredi Conway MD    Office Visit    1 year ago Type 2 diabetes mellitus without complication, without long-term current use of insulin [facility])    [address], [address], Fredi Conway MD    Office Visit    3 years ago Type 2 diabetes mellitus without complication, without long-term current use of insulin [facility])    [address] [facility], Fredi Conway MD    Office Visit    4 years ago Type 2 diabetes mellitus without complication, without long-term current use of insulin [facility])    [address], [address], Fredi Conway MD    Office Visit

## 2023-08-23 DIAGNOSIS — E78.00 PURE HYPERCHOLESTEROLEMIA: ICD-10-CM

## 2023-08-24 NOTE — TELEPHONE ENCOUNTER
Please review. Protocol failed / Has no protocol.      Requested Prescriptions   Pending Prescriptions Disp Refills    ATORVASTATIN 40 MG Oral Tab [Pharmacy Med Name: ATORVASTATIN 40MG TABLETS] 90 tablet 0     Sig: TAKE 1 TABLET BY MOUTH EVERY NIGHT AT BEDTIME       Cholesterol Medication Protocol Failed - 8/23/2023 12:33 PM        Failed - LDL in past 12 months        Failed - Last ALT < 80     Lab Results   Component Value Date    ALT 82 (H) 11/17/2022             Failed - Last LDL < 130     Lab Results   Component Value Date    LDL 87 11/12/2021             Passed - ALT in past 12 months        Passed - In person appointment or virtual visit in the past 12 mos or appointment in next 3 mos     Recent Outpatient Visits              9 months ago Type 2 diabetes mellitus without complication, without long-term current use of insulin ([address]    [address], [place] [address], Manny Hammonds MD    Office Visit    1 year ago Type 2 diabetes mellitus without complication, without long-term current use of insulin ([address]    [address], [place] [address], Sravanthi Roe MD    Office Visit    1 year ago Type 2 diabetes mellitus without complication, without long-term current use of insulin [facility])    [address], [place] [address], Sravanthi Roe MD    Office Visit    3 years ago Type 2 diabetes mellitus without complication, without long-term current use of insulin [facility])    [address], [address], Manny Hammonds MD    Office Visit    4 years ago Type 2 diabetes mellitus without complication, without long-term current use of insulin [facility])    [address], [address], Sravanthi Roe MD    Office Visit                            Recent Outpatient Visits              9 months ago Type 2 diabetes mellitus without complication, without long-term current use of insulin ([address]    [facility] Kristy Bazzi, Manny Dejesus MD    Office Visit    1 year ago Type 2 diabetes mellitus without complication, without long-term current use of insulin [facility])    [address], [address], Manny Dejesus MD    Office Visit    1 year ago Type 2 diabetes mellitus without complication, without long-term current use of insulin [facility])    [address], [address], Lona Phoenix, MD    Office Visit    3 years ago Type 2 diabetes mellitus without complication, without long-term current use of insulin [facility])    [address], Lona Phoenix, MD    Office Visit    4 years ago Type 2 diabetes mellitus without complication, without long-term current use of insulin [facility])    [address], Lona Phoenix, MD    Office Visit

## 2023-08-25 RX ORDER — ATORVASTATIN CALCIUM 40 MG/1
40 TABLET, FILM COATED ORAL NIGHTLY
Qty: 90 TABLET | Refills: 0 | Status: SHIPPED | OUTPATIENT
Start: 2023-08-25

## 2023-09-05 DIAGNOSIS — I10 PRIMARY HYPERTENSION: ICD-10-CM

## 2023-09-06 RX ORDER — AMLODIPINE AND OLMESARTAN MEDOXOMIL 10; 40 MG/1; MG/1
1 TABLET ORAL DAILY
Qty: 30 TABLET | Refills: 0 | OUTPATIENT
Start: 2023-09-06

## 2023-09-13 ENCOUNTER — TELEPHONE (OUTPATIENT)
Dept: FAMILY MEDICINE CLINIC | Facility: CLINIC | Age: 38
End: 2023-09-13

## 2023-09-13 DIAGNOSIS — E11.9 TYPE 2 DIABETES MELLITUS WITHOUT COMPLICATION, WITHOUT LONG-TERM CURRENT USE OF INSULIN (HCC): Primary | ICD-10-CM

## 2023-09-23 ENCOUNTER — TELEPHONE (OUTPATIENT)
Dept: FAMILY MEDICINE CLINIC | Facility: CLINIC | Age: 38
End: 2023-09-23

## 2023-09-23 ENCOUNTER — LAB ENCOUNTER (OUTPATIENT)
Dept: LAB | Age: 38
End: 2023-09-23
Attending: FAMILY MEDICINE
Payer: COMMERCIAL

## 2023-09-23 DIAGNOSIS — E11.9 TYPE 2 DIABETES MELLITUS WITHOUT COMPLICATION, WITHOUT LONG-TERM CURRENT USE OF INSULIN (HCC): ICD-10-CM

## 2023-09-23 DIAGNOSIS — I10 PRIMARY HYPERTENSION: ICD-10-CM

## 2023-09-23 LAB
EST. AVERAGE GLUCOSE BLD GHB EST-MCNC: 192 MG/DL (ref 68–126)
HBA1C MFR BLD: 8.3 % (ref ?–5.7)

## 2023-09-23 PROCEDURE — 36415 COLL VENOUS BLD VENIPUNCTURE: CPT

## 2023-09-23 PROCEDURE — 83036 HEMOGLOBIN GLYCOSYLATED A1C: CPT

## 2023-09-23 PROCEDURE — 3052F HG A1C>EQUAL 8.0%<EQUAL 9.0%: CPT | Performed by: FAMILY MEDICINE

## 2023-09-23 RX ORDER — SITAGLIPTIN AND METFORMIN HYDROCHLORIDE 500; 50 MG/1; MG/1
1 TABLET, FILM COATED ORAL 2 TIMES DAILY WITH MEALS
Qty: 30 TABLET | Refills: 0 | Status: CANCELLED | OUTPATIENT
Start: 2023-09-23

## 2023-09-25 RX ORDER — AMLODIPINE AND OLMESARTAN MEDOXOMIL 10; 40 MG/1; MG/1
1 TABLET ORAL DAILY
Qty: 60 TABLET | Refills: 0 | OUTPATIENT
Start: 2023-09-25

## 2023-09-25 NOTE — TELEPHONE ENCOUNTER
Please review. Protocol failed / Has no protocol. [employer] message sent to patient to schedule an office visit with PCP. Will also make a phone attempt. Requested Prescriptions   Pending Prescriptions Disp Refills    amLODIPine-Olmesartan 10-40 MG Oral Tab 90 tablet 0     Sig: Take 1 tablet by mouth daily. Hypertensive Medications Protocol Failed - 9/23/2023  1:21 PM        Failed - CMP or BMP in past 6 months     No results found for this or any previous visit (from the past 4392 hour(s)).             Failed - In person appointment or virtual visit in the past 6 months     Recent Outpatient Visits              10 months ago Type 2 diabetes mellitus without complication, without long-term current use of insulin ([address]    [address] Delynn Solar, MD    Office Visit    1 year ago Type 2 diabetes mellitus without complication, without long-term current use of insulin [facility])    [address] Delynn Solar, MD    Office Visit    1 year ago Type 2 diabetes mellitus without complication, without long-term current use of insulin [facility])    [address] Delynn Solar, MD    Office Visit    3 years ago Type 2 diabetes mellitus without complication, without long-term current use of insulin [facility])    [address] [facility] Kristen Champagne MD    Office Visit    4 years ago Type 2 diabetes mellitus without complication, without long-term current use of insulin [facility])    [address] Delynn Solar, MD    Office Visit                      Failed - EGFRCR or GFRNAA > 50     GFR Evaluation            Passed - In person appointment in the past 12 or next 3 months     Recent Outpatient Visits              10 months ago Type 2 diabetes mellitus without complication, without long-term current use of insulin ([address] [facility] Manny Dnubar MD    Office Visit    1 year ago Type 2 diabetes mellitus without complication, without long-term current use of insulin [facility])    [employer], [address], Manny Dunbar MD    Office Visit    1 year ago Type 2 diabetes mellitus without complication, without long-term current use of insulin [facility])    [employer], [address], Milla Lake MD    Office Visit    3 years ago Type 2 diabetes mellitus without complication, without long-term current use of insulin [facility])    [employer], [address], Milla Lake MD    Office Visit    4 years ago Type 2 diabetes mellitus without complication, without long-term current use of insulin [facility])    [employer], [address], Milla Lake MD    Office Visit                      Passed - Last BP reading less than 140/90     BP Readings from Last 1 Encounters:  11/19/22 : 122/60                    Recent Outpatient Visits              10 months ago Type 2 diabetes mellitus without complication, without long-term current use of insulin ([facility]    [employer], [address], Milla Lake MD    Office Visit    1 year ago Type 2 diabetes mellitus without complication, without long-term current use of insulin [facility])    [address], Milla Lake MD    Office Visit    1 year ago Type 2 diabetes mellitus without complication, without long-term current use of insulin [facility])    [address], Milla Lake MD    Office Visit    3 years ago Type 2 diabetes mellitus without complication, without long-term current use of insulin [facility])    [address], Milla Lake MD    Office Visit    4 years ago Type 2 diabetes mellitus without complication, without long-term current use of insulin [facility])    [address] [facility], Devante Paul MD    Office Visit

## 2023-09-26 NOTE — TELEPHONE ENCOUNTER
2nd attempt/first below. Left voice mail message for pt to call back. Please, see message below when the call is returned. Pt also read his [employer] message on 9-25-23.

## 2023-10-10 ENCOUNTER — TELEPHONE (OUTPATIENT)
Dept: FAMILY MEDICINE CLINIC | Facility: CLINIC | Age: 38
End: 2023-10-10

## 2023-10-10 NOTE — TELEPHONE ENCOUNTER
Patient is overdue for annual px. [employer] message sent with information advising patient to contact our office for clinical staff to help schedule appointment with Dr. Thor Finnegan.

## 2023-10-19 ENCOUNTER — OFFICE VISIT (OUTPATIENT)
Dept: FAMILY MEDICINE CLINIC | Facility: CLINIC | Age: 38
End: 2023-10-19
Payer: COMMERCIAL

## 2023-10-19 VITALS
SYSTOLIC BLOOD PRESSURE: 111 MMHG | DIASTOLIC BLOOD PRESSURE: 73 MMHG | HEIGHT: 71 IN | BODY MASS INDEX: 31.22 KG/M2 | WEIGHT: 223 LBS | HEART RATE: 88 BPM

## 2023-10-19 DIAGNOSIS — E78.00 PURE HYPERCHOLESTEROLEMIA: ICD-10-CM

## 2023-10-19 DIAGNOSIS — I10 PRIMARY HYPERTENSION: ICD-10-CM

## 2023-10-19 DIAGNOSIS — I10 PRIMARY HYPERTENSION: Primary | ICD-10-CM

## 2023-10-19 DIAGNOSIS — Z23 INFLUENZA VACCINE NEEDED: ICD-10-CM

## 2023-10-19 DIAGNOSIS — E11.9 TYPE 2 DIABETES MELLITUS WITHOUT COMPLICATION, WITHOUT LONG-TERM CURRENT USE OF INSULIN (HCC): ICD-10-CM

## 2023-10-19 PROCEDURE — 3074F SYST BP LT 130 MM HG: CPT | Performed by: FAMILY MEDICINE

## 2023-10-19 PROCEDURE — 3078F DIAST BP <80 MM HG: CPT | Performed by: FAMILY MEDICINE

## 2023-10-19 PROCEDURE — 3008F BODY MASS INDEX DOCD: CPT | Performed by: FAMILY MEDICINE

## 2023-10-19 PROCEDURE — 90471 IMMUNIZATION ADMIN: CPT | Performed by: FAMILY MEDICINE

## 2023-10-19 PROCEDURE — 99214 OFFICE O/P EST MOD 30 MIN: CPT | Performed by: FAMILY MEDICINE

## 2023-10-19 PROCEDURE — 90686 IIV4 VACC NO PRSV 0.5 ML IM: CPT | Performed by: FAMILY MEDICINE

## 2023-10-19 RX ORDER — CHLORTHALIDONE 25 MG/1
25 TABLET ORAL DAILY
Qty: 30 TABLET | Refills: 0 | Status: SHIPPED | OUTPATIENT
Start: 2023-10-19 | End: 2023-10-19

## 2023-10-19 RX ORDER — CHLORTHALIDONE 25 MG/1
25 TABLET ORAL DAILY
Qty: 90 TABLET | Refills: 0 | Status: SHIPPED | OUTPATIENT
Start: 2023-10-19 | End: 2024-01-17

## 2023-10-19 RX ORDER — SITAGLIPTIN AND METFORMIN HYDROCHLORIDE 500; 50 MG/1; MG/1
1 TABLET, FILM COATED ORAL 2 TIMES DAILY WITH MEALS
Qty: 180 TABLET | Refills: 0 | Status: SHIPPED | OUTPATIENT
Start: 2023-10-19 | End: 2024-01-17

## 2023-10-19 RX ORDER — AMLODIPINE AND OLMESARTAN MEDOXOMIL 10; 40 MG/1; MG/1
1 TABLET ORAL DAILY
Qty: 90 TABLET | Refills: 0 | Status: SHIPPED | OUTPATIENT
Start: 2023-10-19

## 2023-10-19 RX ORDER — ATORVASTATIN CALCIUM 40 MG/1
40 TABLET, FILM COATED ORAL NIGHTLY
Qty: 90 TABLET | Refills: 0 | Status: SHIPPED | OUTPATIENT
Start: 2023-10-19

## 2023-10-20 RX ORDER — CHLORTHALIDONE 25 MG/1
25 TABLET ORAL DAILY
Qty: 90 TABLET | Refills: 0 | OUTPATIENT
Start: 2023-10-20

## 2023-10-20 NOTE — TELEPHONE ENCOUNTER
90 day supply was sent. chlorthalidone 25 MG Oral Tab 90 tablet 0 10/19/2023 1/17/2024    Sig - Route:  Take 1 tablet (25 mg total) by mouth daily. - Oral    Sent to pharmacy as: Chlorthalidone 25 MG Oral Tablet ([place])    E-Prescribing Status: Receipt confirmed by pharmacy (10/19/2023 10:38 AM CDT)      Associated Diagnoses    Primary hypertension  - [facility] #[id number] - [address], [phone], [phone]

## 2023-10-25 ENCOUNTER — LAB ENCOUNTER (OUTPATIENT)
Dept: LAB | Age: 38
End: 2023-10-25
Attending: FAMILY MEDICINE

## 2023-10-25 DIAGNOSIS — I10 PRIMARY HYPERTENSION: ICD-10-CM

## 2023-10-25 DIAGNOSIS — E11.9 TYPE 2 DIABETES MELLITUS WITHOUT COMPLICATION, WITHOUT LONG-TERM CURRENT USE OF INSULIN (HCC): ICD-10-CM

## 2023-10-25 DIAGNOSIS — E78.00 PURE HYPERCHOLESTEROLEMIA: ICD-10-CM

## 2023-10-25 LAB
ALBUMIN SERPL-MCNC: 4 G/DL (ref 3.4–5)
ALBUMIN/GLOB SERPL: 0.8 {RATIO} (ref 1–2)
ALP LIVER SERPL-CCNC: 112 U/L
ALT SERPL-CCNC: 47 U/L
ANION GAP SERPL CALC-SCNC: 8 MMOL/L (ref 0–18)
AST SERPL-CCNC: 52 U/L (ref 15–37)
BILIRUB SERPL-MCNC: 0.6 MG/DL (ref 0.1–2)
BUN BLD-MCNC: 16 MG/DL (ref 7–18)
BUN/CREAT SERPL: 11.8 (ref 10–20)
CALCIUM BLD-MCNC: 10.1 MG/DL (ref 8.5–10.1)
CHLORIDE SERPL-SCNC: 101 MMOL/L (ref 98–112)
CHOLEST SERPL-MCNC: 134 MG/DL (ref ?–200)
CO2 SERPL-SCNC: 27 MMOL/L (ref 21–32)
CREAT BLD-MCNC: 1.36 MG/DL
CREAT UR-SCNC: 96.6 MG/DL
EGFRCR SERPLBLD CKD-EPI 2021: 68 ML/MIN/1.73M2 (ref 60–?)
FASTING PATIENT LIPID ANSWER: YES
FASTING STATUS PATIENT QL REPORTED: YES
GLOBULIN PLAS-MCNC: 5.1 G/DL (ref 2.8–4.4)
GLUCOSE BLD-MCNC: 168 MG/DL (ref 70–99)
HDLC SERPL-MCNC: 28 MG/DL (ref 40–59)
LDLC SERPL CALC-MCNC: 72 MG/DL (ref ?–100)
MICROALBUMIN UR-MCNC: <0.5 MG/DL
NONHDLC SERPL-MCNC: 106 MG/DL (ref ?–130)
OSMOLALITY SERPL CALC.SUM OF ELEC: 287 MOSM/KG (ref 275–295)
POTASSIUM SERPL-SCNC: 3.9 MMOL/L (ref 3.5–5.1)
PROT SERPL-MCNC: 9.1 G/DL (ref 6.4–8.2)
SODIUM SERPL-SCNC: 136 MMOL/L (ref 136–145)
TRIGL SERPL-MCNC: 202 MG/DL (ref 30–149)
VLDLC SERPL CALC-MCNC: 31 MG/DL (ref 0–30)

## 2023-10-25 PROCEDURE — 80061 LIPID PANEL: CPT

## 2023-10-25 PROCEDURE — 36415 COLL VENOUS BLD VENIPUNCTURE: CPT

## 2023-10-25 PROCEDURE — 82043 UR ALBUMIN QUANTITATIVE: CPT

## 2023-10-25 PROCEDURE — 82570 ASSAY OF URINE CREATININE: CPT

## 2023-10-25 PROCEDURE — 3061F NEG MICROALBUMINURIA REV: CPT | Performed by: FAMILY MEDICINE

## 2023-10-25 PROCEDURE — 80053 COMPREHEN METABOLIC PANEL: CPT

## 2023-10-26 ENCOUNTER — HOSPITAL ENCOUNTER (OUTPATIENT)
Dept: ULTRASOUND IMAGING | Age: 38
Discharge: HOME | End: 2023-10-26
Attending: FAMILY MEDICINE

## 2023-10-26 DIAGNOSIS — R79.89 ELEVATED LFTS: ICD-10-CM

## 2023-10-26 PROCEDURE — 76705 ECHO EXAM OF ABDOMEN: CPT | Performed by: FAMILY MEDICINE

## 2023-11-10 ENCOUNTER — OFFICE VISIT (OUTPATIENT)
Dept: FAMILY MEDICINE CLINIC | Facility: CLINIC | Age: 38
End: 2023-11-10
Payer: COMMERCIAL

## 2023-11-10 ENCOUNTER — NURSE ONLY (OUTPATIENT)
Dept: INTERNAL MEDICINE CLINIC | Facility: CLINIC | Age: 38
End: 2023-11-10

## 2023-11-10 VITALS
WEIGHT: 224 LBS | DIASTOLIC BLOOD PRESSURE: 60 MMHG | HEART RATE: 94 BPM | HEIGHT: 71 IN | SYSTOLIC BLOOD PRESSURE: 128 MMHG | BODY MASS INDEX: 31.36 KG/M2

## 2023-11-10 DIAGNOSIS — E11.9 TYPE 2 DIABETES MELLITUS WITHOUT COMPLICATION, WITHOUT LONG-TERM CURRENT USE OF INSULIN (HCC): Primary | ICD-10-CM

## 2023-11-10 DIAGNOSIS — E78.00 PURE HYPERCHOLESTEROLEMIA: ICD-10-CM

## 2023-11-10 DIAGNOSIS — I10 PRIMARY HYPERTENSION: ICD-10-CM

## 2023-11-10 DIAGNOSIS — E11.9 TYPE 2 DIABETES MELLITUS WITHOUT COMPLICATION, WITHOUT LONG-TERM CURRENT USE OF INSULIN (HCC): ICD-10-CM

## 2023-11-10 DIAGNOSIS — E11.311 TYPE 2 DIABETES MELLITUS WITH BOTH EYES AFFECTED BY RETINOPATHY AND MACULAR EDEMA, WITHOUT LONG-TERM CURRENT USE OF INSULIN, UNSPECIFIED RETINOPATHY SEVERITY (HCC): Primary | ICD-10-CM

## 2023-11-10 PROCEDURE — 3074F SYST BP LT 130 MM HG: CPT | Performed by: FAMILY MEDICINE

## 2023-11-10 PROCEDURE — 3008F BODY MASS INDEX DOCD: CPT | Performed by: FAMILY MEDICINE

## 2023-11-10 PROCEDURE — 92229 IMG RTA DETC/MNTR DS POC ALY: CPT | Performed by: FAMILY MEDICINE

## 2023-11-10 PROCEDURE — 2026F EYE IMG VALID EVC RTNOPTHY: CPT | Performed by: FAMILY MEDICINE

## 2023-11-10 PROCEDURE — 99214 OFFICE O/P EST MOD 30 MIN: CPT | Performed by: FAMILY MEDICINE

## 2023-11-10 PROCEDURE — 3078F DIAST BP <80 MM HG: CPT | Performed by: FAMILY MEDICINE

## 2023-11-10 RX ORDER — CHLORTHALIDONE 25 MG/1
25 TABLET ORAL DAILY
Qty: 90 TABLET | Refills: 1 | Status: SHIPPED | OUTPATIENT
Start: 2023-11-10 | End: 2024-02-08

## 2023-11-10 RX ORDER — SEMAGLUTIDE 0.68 MG/ML
INJECTION, SOLUTION SUBCUTANEOUS
Qty: 5 EACH | Refills: 1 | Status: SHIPPED | OUTPATIENT
Start: 2023-11-10

## 2023-11-12 ENCOUNTER — TELEPHONE (OUTPATIENT)
Dept: CASE MANAGEMENT | Age: 38
End: 2023-11-12

## 2023-11-12 DIAGNOSIS — E11.9 TYPE 2 DIABETES MELLITUS WITHOUT COMPLICATION, WITHOUT LONG-TERM CURRENT USE OF INSULIN (HCC): Primary | ICD-10-CM

## 2023-11-12 NOTE — TELEPHONE ENCOUNTER
Hello     I have received a referral for     DIABETIC RETINOPATHY EXAM - OU - BOTH EYES       Please confirm location and who will be performing.     Thank you,  [place]  Referral specialist

## 2023-11-13 ENCOUNTER — TELEPHONE (OUTPATIENT)
Dept: INTERNAL MEDICINE CLINIC | Facility: CLINIC | Age: 38
End: 2023-11-13

## 2023-11-13 NOTE — TELEPHONE ENCOUNTER
chlorthalidone 25 MG Oral Tab, Take 1 tablet (25 mg total) by mouth daily. , Disp: 90 tablet, Rfl: 1    semaglutide (OZEMPIC, 0.25 OR 0.5 MG/DOSE,) 2 MG/3ML Subcutaneous Solution Pen-injector, Administer a dose of 0.25 mg subcutaneously once weekly, for 4 weeks then 0.5 mg Q week, Disp: 5 each, Rfl: 1      The patients plan does not cover the prescribed drug w/o a prior authorization. Please contact plan at [phone] to initiate a prior authorization or call/fax the pharmacy to change medication.  The patients recipient ID is [id number]

## 2023-12-22 RX ORDER — SEMAGLUTIDE 0.68 MG/ML
INJECTION, SOLUTION SUBCUTANEOUS
Qty: 5 EACH | Refills: 1 | OUTPATIENT
Start: 2023-12-22

## 2024-01-20 DIAGNOSIS — E11.9 TYPE 2 DIABETES MELLITUS WITHOUT COMPLICATION, WITHOUT LONG-TERM CURRENT USE OF INSULIN (HCC): ICD-10-CM

## 2024-01-20 DIAGNOSIS — I10 PRIMARY HYPERTENSION: ICD-10-CM

## 2024-01-22 DIAGNOSIS — E78.00 PURE HYPERCHOLESTEROLEMIA: ICD-10-CM

## 2024-01-22 DIAGNOSIS — I10 PRIMARY HYPERTENSION: ICD-10-CM

## 2024-01-22 RX ORDER — ATORVASTATIN CALCIUM 40 MG/1
40 TABLET, FILM COATED ORAL NIGHTLY
Qty: 90 TABLET | Refills: 3 | Status: SHIPPED | OUTPATIENT
Start: 2024-01-22

## 2024-01-23 RX ORDER — AMLODIPINE AND OLMESARTAN MEDOXOMIL 10; 40 MG/1; MG/1
1 TABLET ORAL DAILY
Qty: 90 TABLET | Refills: 3 | Status: SHIPPED | OUTPATIENT
Start: 2024-01-23

## 2024-01-23 RX ORDER — SITAGLIPTIN AND METFORMIN HYDROCHLORIDE 500; 50 MG/1; MG/1
1 TABLET, FILM COATED ORAL 2 TIMES DAILY WITH MEALS
Qty: 180 TABLET | Refills: 0 | Status: SHIPPED | OUTPATIENT
Start: 2024-01-23

## 2024-01-23 NOTE — TELEPHONE ENCOUNTER
Refill passed per [facility] protocol.    Requested Prescriptions   Pending Prescriptions Disp Refills    atorvastatin 40 MG Oral Tab 90 tablet 0     Sig: Take 1 tablet (40 mg total) by mouth at bedtime.       Cholesterol Medication Protocol Passed - 1/22/2024  9:34 AM        Passed - ALT in past 12 months        Passed - LDL in past 12 months        Passed - Last ALT < 80     Lab Results   Component Value Date    ALT 47 10/25/2023             Passed - Last LDL < 130     Lab Results   Component Value Date    LDL 72 10/25/2023             Passed - In person appointment or virtual visit in the past 12 mos or appointment in next 3 mos     Recent Outpatient Visits              2 months ago Type 2 diabetes mellitus without complication, without long-term current use of insulin ([facility]    2 months ago Type 2 diabetes mellitus without complication, without long-term current use of insulin ([facility])    [facility]    [facility] Visit    3 months ago Primary hypertension    [facility] Sanaz Khan DO    Office Visit    1 year ago Type 2 diabetes mellitus without complication, without long-term current use of insulin ([facility])    [facility]    [facility] Visit    1 year ago Type 2 diabetes mellitus without complication, without long-term current use of insulin ([facility])    [facility]    [facility] Visit          Future Appointments         Provider Department Appt Notes    In 2 weeks Arsenio Sidhu MD [facility], [address], Manny 3 month f/u

## 2024-01-23 NOTE — TELEPHONE ENCOUNTER
Please review; protocol failed/ has no protocol    Requested Prescriptions   Pending Prescriptions Disp Refills    SITagliptin-metFORMIN HCl (SAHRAUMET)  MG Oral Tab [Pharmacy Med Name: JANUMET 50/500MG TABLETS] 180 tablet 0     Sig: Take 1 tablet by mouth 2 (two) times daily with meals.       Diabetes Medication Protocol Failed - 1/20/2024  2:06 PM        Failed - Last A1C < 7.5 and within past 6 months     Lab Results   Component Value Date    A1C 8.3 (H) 09/23/2023             Passed - In person appointment or virtual visit in the past 6 mos or appointment in next 3 mos     Recent Outpatient Visits              2 months ago Type 2 diabetes mellitus without complication, without long-term current use of insulin ([facility])    [facility]    Nurse Only    2 months ago Type 2 diabetes mellitus without complication, without long-term current use of insulin ([facility])    [facility]    Office Visit    3 months ago Primary hypertension    [facility] [place] Sanaz Khan DO    Office Visit    1 year ago Type 2 diabetes mellitus without complication, without long-term current use of insulin ([facility])    [facility]    Office Visit    1 year ago Type 2 diabetes mellitus without complication, without long-term current use of insulin ([facility])    [facility]    [facility] Visit          Future Appointments         Provider Department Appt Notes    In 2 weeks [facility]               Passed - EGFRCR or GFRNAA > 50     GFR Evaluation  EGFRCR: 68 , resulted on 10/25/2023          Passed - GFR in the past 12 months         Signed Prescriptions Disp Refills    amLODIPine-Olmesartan 10-40 MG Oral Tab 90  tablet 3     Sig: Take 1 tablet by mouth daily.       Hypertensive Medications Protocol Passed - 1/20/2024  2:06 PM        Passed - In person appointment in the past 12 or next 3 months     Recent Outpatient Visits              2 months ago Type 2 diabetes mellitus without complication, without long-term current use of insulin ([facility])    [facility]    Nurse Only    2 months ago Type 2 diabetes mellitus without complication, without long-term current use of insulin ([facility])    [facility]    Office Visit    3 months ago Primary hypertension    [facility] Sanaz Harrell DO    Office Visit    1 year ago Type 2 diabetes mellitus without complication, without long-term current use of insulin ([facility])    [facility]    Office Visit    1 year ago Type 2 diabetes mellitus without complication, without long-term current use of insulin ([facility])    [facility]    Office Visit          Future Appointments         Provider Department Appt Notes    In 2 weeks Arsenio Sidhu MD [facility] [place] 3 month f/u               Passed - Last BP reading less than 140/90     BP Readings from Last 1 Encounters:   11/10/23 128/60               Passed - CMP or BMP in past 6 months     Recent Results (from the past 4392 hour(s))   Comp Metabolic Panel (14)    Collection Time: 10/25/23 12:03 PM   Result Value Ref Range    Glucose 168 (H) 70 - 99 mg/dL    Sodium 136 136 - 145 mmol/L    Potassium 3.9 3.5 - 5.1 mmol/L    Chloride 101 98 - 112 mmol/L    CO2 27.0 21.0 - 32.0 mmol/L    Anion Gap 8 0 - 18 mmol/L    BUN 16 7 - 18 mg/dL    Creatinine 1.36 (H) 0.70 - 1.30 mg/dL    BUN/CREA Ratio 11.8 10.0 - 20.0    Calcium, Total 10.1 8.5 - 10.1 mg/dL     Calculated Osmolality 287 275 - 295 mOsm/kg    eGFR-Cr 68 >=60 mL/min/1.73m2    ALT 47 16 - 61 U/L    AST 52 (H) 15 - 37 U/L    Alkaline Phosphatase 112 45 - 117 U/L    Bilirubin, Total 0.6 0.1 - 2.0 mg/dL    Total Protein 9.1 (H) 6.4 - 8.2 g/dL    Albumin 4.0 3.4 - 5.0 g/dL    Globulin  5.1 (H) 2.8 - 4.4 g/dL    A/G Ratio 0.8 (L) 1.0 - 2.0    Patient Fasting for CMP? Yes      *Note: Due to a large number of results and/or encounters for the requested time period, some results have not been displayed. A complete set of results can be found in Results Review.               Passed - In person appointment or virtual visit in the past 6 months     Recent Outpatient Visits              2 months ago Type 2 diabetes mellitus without complication, without long-term current use of insulin ([facility])    [facility] [place]    Nurse Only    2 months ago Type 2 diabetes mellitus without complication, without long-term current use of insulin ([facility])    [facility]    Office Visit    3 months ago Primary hypertension    [facility] [place] Sanaz Khan DO    Office Visit    1 year ago Type 2 diabetes mellitus without complication, without long-term current use of insulin ([facility])    [facility]    Office Visit    1 year ago Type 2 diabetes mellitus without complication, without long-term current use of insulin ([facility])    [facility]    Office Visit          Future Appointments         Provider Department Appt Notes    In 2 weeks Arsenio Sidhu MD [facility] [address], [place] 3 month f/u               Passed - EGFRCR or GFRNAA > 50     GFR Evaluation  EGFRCR: 68 , resulted on 10/25/2023             Recent Outpatient Visits              2 months ago Type  2 diabetes mellitus without complication, without long-term current use of insulin ([facility]    2 months ago Type 2 diabetes mellitus without complication, without long-term current use of insulin ([facility])    [facility]    Office Visit    3 months ago Primary hypertension    [facility] Sanaz Khan DO    Office Visit    1 year ago Type 2 diabetes mellitus without complication, without long-term current use of insulin ([facility])    [facility]    Office Visit    1 year ago Type 2 diabetes mellitus without complication, without long-term current use of insulin ([facility])    [facility]    [facility] Visit          Future Appointments         Provider Department Appt Notes    In 2 weeks Arsenio Sidhu MD [facility], [address], [place] 3 month f/u

## 2024-01-23 NOTE — TELEPHONE ENCOUNTER
Refill passed per [facility] protocol.  Requested Prescriptions   Pending Prescriptions Disp Refills    JANUMET  MG Oral Tab [Pharmacy Med Name: JANUMET 50/500MG TABLETS] 180 tablet 0     Sig: TAKE 1 TABLET BY MOUTH TWICE DAILY WITH MEALS       Diabetes Medication Protocol Failed - 1/20/2024  2:06 PM        Failed - Last A1C < 7.5 and within past 6 months     Lab Results   Component Value Date    A1C 8.3 (H) 09/23/2023             Passed - In person appointment or virtual visit in the past 6 mos or appointment in next 3 mos     Recent Outpatient Visits              2 months ago Type 2 diabetes mellitus without complication, without long-term current use of insulin ([facility])    [facility] [place]    Nurse Only    2 months ago Type 2 diabetes mellitus without complication, without long-term current use of insulin ([facility])    [facility]    Office Visit    3 months ago Primary hypertension    [facility] Sanaz Khan DO    Office Visit    1 year ago Type 2 diabetes mellitus without complication, without long-term current use of insulin ([facility])    [facility]    Office Visit    1 year ago Type 2 diabetes mellitus without complication, without long-term current use of insulin ([facility])    [facility]    [facility] Visit          Future Appointments         Provider Department Appt Notes    In 2 weeks [facility]               Passed - EGFRCR or GFRNAA > 50     GFR Evaluation  EGFRCR: 68 , resulted on 10/25/2023          Passed - GFR in the past 12 months          AMLODIPINE-OLMESARTAN 10-40 MG Oral Tab [Pharmacy Med Name: AMLODIPINE/OLMES MEDOXOM 10-40MG T] 90 tablet 0     Sig: Take 1  tablet by mouth daily.       Hypertensive Medications Protocol Passed - 1/20/2024  2:06 PM        Passed - In person appointment in the past 12 or next 3 months     Recent Outpatient Visits              2 months ago Type 2 diabetes mellitus without complication, without long-term current use of insulin ([facility])    [facility]    Nurse Only    2 months ago Type 2 diabetes mellitus without complication, without long-term current use of insulin ([facility])    [facility] Arsenio Sidhu MD    Office Visit    3 months ago Primary hypertension    [facility]    Office Visit    1 year ago Type 2 diabetes mellitus without complication, without long-term current use of insulin ([facility])    [facility]    Office Visit    1 year ago Type 2 diabetes mellitus without complication, without long-term current use of insulin ([facility])    [facility] Arsenio Sidhu MD    Office Visit          Future Appointments         Provider Department Appt Notes    In 2 weeks Arsenio Sidhu MD [facility] 3 month f/u               Passed - Last BP reading less than 140/90     BP Readings from Last 1 Encounters:   11/10/23 128/60               Passed - CMP or BMP in past 6 months     Recent Results (from the past 4392 hour(s))   Comp Metabolic Panel (14)    Collection Time: 10/25/23 12:03 PM   Result Value Ref Range    Glucose 168 (H) 70 - 99 mg/dL    Sodium 136 136 - 145 mmol/L    Potassium 3.9 3.5 - 5.1 mmol/L    Chloride 101 98 - 112 mmol/L    CO2 27.0 21.0 - 32.0 mmol/L    Anion Gap 8 0 - 18 mmol/L    BUN 16 7 - 18 mg/dL    Creatinine 1.36 (H) 0.70 - 1.30 mg/dL    BUN/CREA Ratio 11.8 10.0 - 20.0    Calcium, Total 10.1 8.5 - 10.1 mg/dL    Calculated Osmolality  287 275 - 295 mOsm/kg    eGFR-Cr 68 >=60 mL/min/1.73m2    ALT 47 16 - 61 U/L    AST 52 (H) 15 - 37 U/L    Alkaline Phosphatase 112 45 - 117 U/L    Bilirubin, Total 0.6 0.1 - 2.0 mg/dL    Total Protein 9.1 (H) 6.4 - 8.2 g/dL    Albumin 4.0 3.4 - 5.0 g/dL    Globulin  5.1 (H) 2.8 - 4.4 g/dL    A/G Ratio 0.8 (L) 1.0 - 2.0    Patient Fasting for CMP? Yes      *Note: Due to a large number of results and/or encounters for the requested time period, some results have not been displayed. A complete set of results can be found in Results Review.               Passed - In person appointment or virtual visit in the past 6 months     Recent Outpatient Visits              2 months ago Type 2 diabetes mellitus without complication, without long-term current use of insulin ([facility])    [facility] [place]    Nurse Only    2 months ago Type 2 diabetes mellitus without complication, without long-term current use of insulin ([facility])    [facility]    Office Visit    3 months ago Primary hypertension    [facility]    Office Visit    1 year ago Type 2 diabetes mellitus without complication, without long-term current use of insulin ([facility])    [facility]    Office Visit    1 year ago Type 2 diabetes mellitus without complication, without long-term current use of insulin ([facility])    [facility]    [facility] Visit          Future Appointments         Provider Department Appt Notes    In 2 weeks Arsenio Sidhu MD [facility], [address], [place] 3 month f/u               Passed - EGFRCR or GFRNAA > 50     GFR Evaluation  EGFRCR: 68 , resulted on 10/25/2023             Recent Outpatient Visits              2 months ago Type 2 diabetes mellitus  without complication, without long-term current use of insulin ([facility])    [facility] [address], [place]    Nurse Only    2 months ago Type 2 diabetes mellitus without complication, without long-term current use of insulin ([facility])    [facility] [facility]    Office Visit    3 months ago Primary hypertension    [facility] [place] Sanaz Khan DO    Office Visit    1 year ago Type 2 diabetes mellitus without complication, without long-term current use of insulin ([facility])    [facility] [place] Ricardo, MD    Office Visit    1 year ago Type 2 diabetes mellitus without complication, without long-term current use of insulin ([facility])    [facility] [place] Baner[place] Ricardo, MD    Office Visit          Future Appointments         Provider Department Appt Notes    In 2 weeks Arsenio Sidhu MD [facility], [address], [place] 3 month f/u

## 2024-01-24 RX ORDER — CHLORTHALIDONE 25 MG/1
25 TABLET ORAL DAILY
Qty: 90 TABLET | Refills: 3 | Status: SHIPPED | OUTPATIENT
Start: 2024-01-24 | End: 2025-01-18

## 2024-01-24 NOTE — TELEPHONE ENCOUNTER
Refill passed per [facility] protocol.  Requested Prescriptions   Pending Prescriptions Disp Refills    chlorthalidone 25 MG Oral Tab 90 tablet 1     Sig: Take 1 tablet (25 mg total) by mouth daily.       Hypertensive Medications Protocol Passed - 1/22/2024  9:34 AM        Passed - In person appointment in the past 12 or next 3 months     Recent Outpatient Visits              2 months ago Type 2 diabetes mellitus without complication, without long-term current use of insulin ([facility])    [facility] [place]    Nurse Only    2 months ago Type 2 diabetes mellitus without complication, without long-term current use of insulin ([facility])    [facility]    Office Visit    3 months ago Primary hypertension    [facility] Sanaz Khan DO    Office Visit    1 year ago Type 2 diabetes mellitus without complication, without long-term current use of insulin ([facility])    [facility]    Office Visit    1 year ago Type 2 diabetes mellitus without complication, without long-term current use of insulin ([facility])    [facility]    [facility]          Future Appointments         Provider Department Appt Notes    In 2 weeks Arsenio Sidhu MD [facility] 3 month f/u               Passed - Last BP reading less than 140/90     BP Readings from Last 1 Encounters:   11/10/23 128/60               Passed - CMP or BMP in past 6 months     Recent Results (from the past 4392 hour(s))   Comp Metabolic Panel (14)    Collection Time: 10/25/23 12:03 PM   Result Value Ref Range    Glucose 168 (H) 70 - 99 mg/dL    Sodium 136 136 - 145 mmol/L    Potassium 3.9 3.5 - 5.1 mmol/L    Chloride 101 98 - 112 mmol/L    CO2 27.0 21.0 - 32.0 mmol/L    Anion Gap  8 0 - 18 mmol/L    BUN 16 7 - 18 mg/dL    Creatinine 1.36 (H) 0.70 - 1.30 mg/dL    BUN/CREA Ratio 11.8 10.0 - 20.0    Calcium, Total 10.1 8.5 - 10.1 mg/dL    Calculated Osmolality 287 275 - 295 mOsm/kg    eGFR-Cr 68 >=60 mL/min/1.73m2    ALT 47 16 - 61 U/L    AST 52 (H) 15 - 37 U/L    Alkaline Phosphatase 112 45 - 117 U/L    Bilirubin, Total 0.6 0.1 - 2.0 mg/dL    Total Protein 9.1 (H) 6.4 - 8.2 g/dL    Albumin 4.0 3.4 - 5.0 g/dL    Globulin  5.1 (H) 2.8 - 4.4 g/dL    A/G Ratio 0.8 (L) 1.0 - 2.0    Patient Fasting for CMP? Yes      *Note: Due to a large number of results and/or encounters for the requested time period, some results have not been displayed. A complete set of results can be found in Results Review.               Passed - In person appointment or virtual visit in the past 6 months     Recent Outpatient Visits              2 months ago Type 2 diabetes mellitus without complication, without long-term current use of insulin ([facility])    [facility]    Nurse Only    2 months ago Type 2 diabetes mellitus without complication, without long-term current use of insulin ([facility])    [facility]    Office Visit    3 months ago Primary hypertension    [facility] Sanaz Khan DO    Office Visit    1 year ago Type 2 diabetes mellitus without complication, without long-term current use of insulin ([facility])    [facility]    Office Visit    1 year ago Type 2 diabetes mellitus without complication, without long-term current use of insulin ([facility])    [facility]    [facility] Visit          Future Appointments         Provider Department Appt Notes    In 2 weeks [facility]                Passed - EGFRCR or GFRNAA > 50     GFR Evaluation  EGFRCR: 68 , resulted on 10/25/2023             Recent Outpatient Visits              2 months ago Type 2 diabetes mellitus without complication, without long-term current use of insulin ([facility])    [facility] [address], [place]    Nurse Only    2 months ago Type 2 diabetes mellitus without complication, without long-term current use of insulin ([facility])    [facility]    Office Visit    3 months ago Primary hypertension    [facility]    Office Visit    1 year ago Type 2 diabetes mellitus without complication, without long-term current use of insulin ([facility])    [facility]    Office Visit    1 year ago Type 2 diabetes mellitus without complication, without long-term current use of insulin ([facility])    [facility]    Office Visit          Future Appointments         Provider Department Appt Notes    In 2 weeks Arsenio Sidhu MD [facility], [address], [place] 3 month f/u

## 2024-02-05 RX ORDER — SEMAGLUTIDE 0.68 MG/ML
INJECTION, SOLUTION SUBCUTANEOUS
Qty: 5 EACH | Refills: 1 | Status: SHIPPED | OUTPATIENT
Start: 2024-02-05

## 2024-02-10 ENCOUNTER — OFFICE VISIT (OUTPATIENT)
Dept: FAMILY MEDICINE CLINIC | Facility: CLINIC | Age: 39
End: 2024-02-10
Payer: COMMERCIAL

## 2024-02-10 VITALS
BODY MASS INDEX: 30.91 KG/M2 | WEIGHT: 220.81 LBS | HEIGHT: 71 IN | SYSTOLIC BLOOD PRESSURE: 138 MMHG | HEART RATE: 87 BPM | DIASTOLIC BLOOD PRESSURE: 75 MMHG

## 2024-02-10 DIAGNOSIS — I10 PRIMARY HYPERTENSION: ICD-10-CM

## 2024-02-10 DIAGNOSIS — E11.65 UNCONTROLLED TYPE 2 DIABETES MELLITUS WITH HYPERGLYCEMIA (HCC): Primary | ICD-10-CM

## 2024-02-10 LAB
CARTRIDGE LOT#: ABNORMAL NUMERIC
HEMOGLOBIN A1C: 8.7 % (ref 4.3–5.6)

## 2024-02-10 PROCEDURE — 99214 OFFICE O/P EST MOD 30 MIN: CPT | Performed by: FAMILY MEDICINE

## 2024-02-10 PROCEDURE — 3008F BODY MASS INDEX DOCD: CPT | Performed by: FAMILY MEDICINE

## 2024-02-10 PROCEDURE — 3078F DIAST BP <80 MM HG: CPT | Performed by: FAMILY MEDICINE

## 2024-02-10 PROCEDURE — 3052F HG A1C>EQUAL 8.0%<EQUAL 9.0%: CPT | Performed by: FAMILY MEDICINE

## 2024-02-10 PROCEDURE — 3075F SYST BP GE 130 - 139MM HG: CPT | Performed by: FAMILY MEDICINE

## 2024-02-10 PROCEDURE — 83036 HEMOGLOBIN GLYCOSYLATED A1C: CPT | Performed by: FAMILY MEDICINE

## 2024-02-10 RX ORDER — ATORVASTATIN CALCIUM 40 MG/1
40 TABLET, FILM COATED ORAL NIGHTLY
Qty: 90 TABLET | Refills: 3 | Status: SHIPPED | OUTPATIENT
Start: 2024-02-10

## 2024-02-10 RX ORDER — AMLODIPINE AND OLMESARTAN MEDOXOMIL 10; 40 MG/1; MG/1
1 TABLET ORAL DAILY
Qty: 90 TABLET | Refills: 3 | Status: SHIPPED | OUTPATIENT
Start: 2024-02-10

## 2024-02-10 RX ORDER — REPAGLINIDE 0.5 MG/1
0.5 TABLET ORAL
Qty: 90 TABLET | Refills: 1 | Status: SHIPPED | OUTPATIENT
Start: 2024-02-10

## 2024-02-10 RX ORDER — CHLORTHALIDONE 25 MG/1
25 TABLET ORAL DAILY
Qty: 90 TABLET | Refills: 3 | Status: SHIPPED | OUTPATIENT
Start: 2024-02-10 | End: 2025-02-04

## 2024-02-10 RX ORDER — SITAGLIPTIN AND METFORMIN HYDROCHLORIDE 500; 50 MG/1; MG/1
1 TABLET, FILM COATED ORAL 2 TIMES DAILY WITH MEALS
Qty: 180 TABLET | Refills: 0 | Status: SHIPPED | OUTPATIENT
Start: 2024-02-10

## 2024-02-10 RX ORDER — SEMAGLUTIDE 0.68 MG/ML
INJECTION, SOLUTION SUBCUTANEOUS
Qty: 5 EACH | Refills: 1 | Status: SHIPPED | OUTPATIENT
Start: 2024-02-10

## 2024-02-10 NOTE — PROGRESS NOTES
2/10/2024  8:14 AM    Abdullahi Mtz is a 38 year old male.    Chief complaint(s):   Chief Complaint   Patient presents with    Hypertension     F/u     Note For Work   Diabetes  HPI:     Abdullahi Mtz primary complaint is regarding as above.       Patient Abdullahi Mtz is a 38 year old male is here to be evaluated for type 2 diabetes.  Specifically, male has type 2, insulin none requiring diabetes. Compliance with treatment has been fair.  Patient's diabetes was first diagnosed 2017.  Patient follows a 2000 calorie ADA diet.  Patient report experiencing the following diabetes related symptoms; Negative for polyuria, Negative for polydipsia, Positive for blurred vision.  Depression symptoms include none.  Tobacco screen: none  smoker.  Current meds include :  oral hypoglycemic include: Janumet  mg BID  insulin/injectable : Ozempic  0.5 mg Q week .  Hypoglycemia severity is not applicable.he reports home blood glucose readings have been [phone] (#s) and believes  having fair glucose control.  Most recent lab results include glycohemoglobin 8.7 %, microalbuminuria have been negative.  In regard to preventative care, his last ophthalmology exam was in > 12 months ago.  Opthalmic evaluation have shown none pathology.  Concurrent relative health problems include HTN, elevated LFTs and hyperlipidemia.      Abdullahi Mtz is a 38 year old male presents with hypertension.  This was first diagnosed in 2015.  Current nonpharmacologic treatment includes low sodium diet, exercise, and meditation.  His current cardiac medication(s) regimen includes: Amlodipine 10mg & Olmesartan 40 mg, Chlolrthalidone 25 mg .  He has kept a blood pressure diary, but states that his blood pressures have been well controll.  he is tolerating his medication(s)  well without side effects.      HISTORY:  Past Medical History:   Diagnosis Date    Dog bite of face age 2    Essential hypertension     Hyperlipidemia     Renal stone 2009     no surgical interventions were required, self expeled      Past Surgical History:   Procedure Laterality Date    OTHER SURGICAL HISTORY  age 2    facial plastic surgery 2nd to dog bite      Family History   Problem Relation Age of Onset    Gastro-Intestinal Disorder Father         diverticulosis    Hypertension Mother     Stroke Mother         cerebrovascular accident (TIA's)    Dementia Maternal Grandfather     Other (Other) Maternal Grandmother         HD ?      Social History:   Social History     Socioeconomic History    Marital status:    Tobacco Use    Smoking status: Former     Packs/day: .25     Types: Cigarettes    Smokeless tobacco: Never   Vaping Use    Vaping Use: Never used   Substance and Sexual Activity    Alcohol use: Yes     Alcohol/week: 0.0 standard drinks of alcohol    Drug use: No        Immunizations:   Immunization History   Administered Date(s) Administered    Covid-19 Vaccine Pfizer 30 mcg/0.3 ml 04/16/2021, 05/10/2021    FLULAVAL 6 months & older 0.5 ml Prefilled syringe ([id number]) 11/12/2021, 11/19/2022, 10/19/2023    TDAP 12/17/2015       Medications (Active prior to today's visit):  Current Outpatient Medications   Medication Sig Dispense Refill    amLODIPine-Olmesartan 10-40 MG Oral Tab Take 1 tablet by mouth daily. 90 tablet 3    atorvastatin 40 MG Oral Tab Take 1 tablet (40 mg total) by mouth at bedtime. 90 tablet 3    chlorthalidone 25 MG Oral Tab Take 1 tablet (25 mg total) by mouth daily. 90 tablet 3    semaglutide (OZEMPIC, 0.25 OR 0.5 MG/DOSE,) 2 MG/3ML Subcutaneous Solution Pen-injector Administer 0.5 mg Q week 5 each 1    SITagliptin-metFORMIN HCl (JANUMET)  MG Oral Tab Take 1 tablet by mouth 2 (two) times daily with meals. 180 tablet 0    Glucose Blood (HÉCTOR CONTOUR NEXT TEST) In Vitro Strip Test blood sugar three times per day. 100 strip 3    MICROLET LANCETS Does not apply Misc TEST BLOOD SUGAR THREE TIMES DAILY 100 each 3       Allergies:  Allergies   Allergen  Reactions    Seasonal OTHER (SEE COMMENTS)     Watery eyes, sneezing, itchy throat         ROS:   Review of Systems   Constitutional:  Negative for appetite change, fatigue and fever.   Respiratory:  Negative for shortness of breath.    Cardiovascular:  Negative for chest pain.   Gastrointestinal:  Negative for abdominal pain.   Endocrine: Negative for polydipsia and polyuria.   Musculoskeletal:  Negative for myalgias.   Skin:  Negative for rash.   Neurological:  Negative for dizziness, weakness and headaches.       PHYSICAL EXAM:   VS: /75   Pulse 87   Ht 5' 11\" (1.803 m)   Wt 220 lb 12.8 oz (100.2 kg)   BMI 30.80 kg/m²     Physical Exam  Vitals reviewed.   Constitutional:       Appearance: Normal appearance. He is well-developed.   HENT:      Head: Normocephalic.   Eyes:      General: No scleral icterus.     Conjunctiva/sclera: Conjunctivae normal.   Cardiovascular:      Rate and Rhythm: Normal rate.   Pulmonary:      Effort: Pulmonary effort is normal.      Breath sounds: Wheezing present.   Musculoskeletal:      Cervical back: Neck supple.   Skin:     Findings: No rash.   Psychiatric:         Mood and Affect: Mood normal.       LABORATORY RESULTS:   No results found for: \"URCOLOR\", \"URCLA\", \"URINELEUK\", \"URINENITRITE\", \"URINEBLOOD\"   Results for orders placed or performed in visit on 11/10/23   Diabetic Retinopathy Exam  OU - Both Eyes   Result Value Ref Range    AI Retinal Exam Result (A)      Diabetic Retinopathy Detected: ETDRS level 35 or higher and/or Diabetic Macular Edema   A1c 8.7    EKG / Spirometry : -     Radiology: No results found.     ASSESSMENT/PLAN:   Assessment   Encounter Diagnoses   Name Primary?    Type 2 diabetes mellitus without complication, without long-term current use of insulin (HCC) Yes    Pure hypercholesterolemia     Primary hypertension        1. Uncontrolled type 2 diabetes mellitus with hyperglycemia (HCC)    DIABETES A&P    LABORATORY & ORDERS: Blood test(s) ordered  today ;   Orders Placed This Encounter   Procedures    POC Glycohemoglobin [[id number]]     Additional orders include:   MEDICATIONS:    amLODIPine-Olmesartan 10-40 MG Oral Tab, Take 1 tablet by mouth daily., Disp: 90 tablet, Rfl: 3    atorvastatin 40 MG Oral Tab, Take 1 tablet (40 mg total) by mouth at bedtime., Disp: 90 tablet, Rfl: 3    chlorthalidone 25 MG Oral Tab, Take 1 tablet (25 mg total) by mouth daily., Disp: 90 tablet, Rfl: 3    semaglutide (OZEMPIC, 0.25 OR 0.5 MG/DOSE,) 2 MG/3ML Subcutaneous Solution Pen-injector, Administer 0.5 mg Q week, Disp: 5 each, Rfl: 1    SITagliptin-metFORMIN HCl (JANUMET)  MG Oral Tab, Take 1 tablet by mouth 2 (two) times daily with meals., Disp: 180 tablet, Rfl: 0    Repaglinide 0.5 MG Oral Tab, Take 1 tablet (0.5 mg total) by mouth 3 (three) times daily before meals., Disp: 90 tablet, Rfl: 1    Glucose Blood (HÉCTOR CONTOUR NEXT TEST) In Vitro Strip, Test blood sugar three times per day., Disp: 100 strip, Rfl: 3    MICROLET LANCETS Does not apply Misc, TEST BLOOD SUGAR THREE TIMES DAILY, Disp: 100 each, Rfl: 3.  Requested Prescriptions     Signed Prescriptions Disp Refills    amLODIPine-Olmesartan 10-40 MG Oral Tab 90 tablet 3     Sig: Take 1 tablet by mouth daily.    atorvastatin 40 MG Oral Tab 90 tablet 3     Sig: Take 1 tablet (40 mg total) by mouth at bedtime.    chlorthalidone 25 MG Oral Tab 90 tablet 3     Sig: Take 1 tablet (25 mg total) by mouth daily.    semaglutide (OZEMPIC, 0.25 OR 0.5 MG/DOSE,) 2 MG/3ML Subcutaneous Solution Pen-injector 5 each 1     Sig: Administer 0.5 mg Q week    SITagliptin-metFORMIN HCl (JANUMET)  MG Oral Tab 180 tablet 0     Sig: Take 1 tablet by mouth 2 (two) times daily with meals.    Repaglinide 0.5 MG Oral Tab 90 tablet 1     Sig: Take 1 tablet (0.5 mg total) by mouth 3 (three) times daily before meals.      REFERRALS:       Procedures    POC Glycohemoglobin [[id number]]     RECOMMENDATIONS: instructed in use of glucometer ( check  fasting glucose multiple times a day), return for training in administering insulin injections, adherence to an 1800 calorie ADA diet,  5 pound weight loss, a graduated exercise program, HgbA1C level checked quarterly, daily foot self-inspection, need for yearly flu shots, and avoid all sodas, juices, candy, chocolates, cakes, ice cream, etc.      FOLLOW-UP: Schedule a follow-up visit in 3 months.       COUNSELING: The patient was counseled concerning the relationship between diabetes control and macrovascular disease including cardiovascular, cerebrovascular and peripheral vascular disease. The patient was counseled concerning the relationship between diabetes control and retinopathy, nephropathy, and neuropathy. Advised as to the targets of pre-meal glucoses ( mg/dl) and post meal glucoses (<140-160 mg/dl) Home glucose testing discussed. The A1c target of <7% according to ADA and <6.5% according to AACE were discussed.        2. Primary hypertension     HTN     MEDICATIONS:   Requested Prescriptions     Signed Prescriptions Disp Refills    amLODIPine-Olmesartan 10-40 MG Oral Tab 90 tablet 3     Sig: Take 1 tablet by mouth daily.               chlorthalidone 25 MG Oral Tab 90 tablet 3     Sig: Take 1 tablet (25 mg total) by mouth daily.     RECOMMENDATIONS given include: avoid pseudoephedrine or other stimulants/decongestants in common cold remedies, decrease consumption of alcohol, perform routine monitoring of blood pressure with home blood pressure cuff, exercise, reduction of dietary salt intake, take medication as prescribed, try not to miss doses, smoking cessation, weight loss, and stress reduction.    FOLLOW-UP: Schedule a follow-up visit in 6 months.          Orders This Visit:  Orders Placed This Encounter   Procedures    POC Glycohemoglobin [[id number]]       Meds This Visit:  Requested Prescriptions     Signed Prescriptions Disp Refills    amLODIPine-Olmesartan 10-40 MG Oral Tab 90 tablet 3     Sig:  Take 1 tablet by mouth daily.    atorvastatin 40 MG Oral Tab 90 tablet 3     Sig: Take 1 tablet (40 mg total) by mouth at bedtime.    chlorthalidone 25 MG Oral Tab 90 tablet 3     Sig: Take 1 tablet (25 mg total) by mouth daily.    semaglutide (OZEMPIC, 0.25 OR 0.5 MG/DOSE,) 2 MG/3ML Subcutaneous Solution Pen-injector 5 each 1     Sig: Administer 0.5 mg Q week    SITagliptin-metFORMIN HCl (JANUMET)  MG Oral Tab 180 tablet 0     Sig: Take 1 tablet by mouth 2 (two) times daily with meals.       Imaging & Referrals:  None         RANDOLPH RUDD MD

## 2024-02-12 NOTE — TELEPHONE ENCOUNTER
Pt is requesting a refill for the following medication        Repaglinide 0.5 MG Oral Tab, Take 1 tablet (0.5 mg total) by mouth 3 (three) times daily before meals., Disp: 90 tablet, Rfl: 1

## 2024-02-13 RX ORDER — REPAGLINIDE 0.5 MG/1
0.5 TABLET ORAL
Qty: 90 TABLET | Refills: 1 | OUTPATIENT
Start: 2024-02-13

## 2024-02-14 NOTE — TELEPHONE ENCOUNTER
Duplicate request, previously addressed.  It was first time prescribed on 2/10/24 for 30 day supply with 1 refill.         Disp Refills Start End    Repaglinide 0.5 MG Oral Tab 90 tablet 1 2/10/2024 --    Sig - Route: Take 1 tablet (0.5 mg total) by mouth 3 (three) times daily before meals. - Oral    Sent to pharmacy as: Repaglinide 0.5 MG Oral Tablet    E-Prescribing Status: Receipt confirmed by pharmacy (2/10/2024  8:33 AM CST)      Pharmacy    [id number] - [place], [place] -  [address] AT [facility] [address], [phone], [phone]

## 2024-03-07 NOTE — TELEPHONE ENCOUNTER
Please review; protocol failed/ No protocol     Requested Prescriptions   Pending Prescriptions Disp Refills    semaglutide (OZEMPIC, 0.25 OR 0.5 MG/DOSE,) 2 MG/3ML Subcutaneous Solution Pen-injector 5 each 1     Sig: Administer 0.5 mg Q week       Diabetes Medication Protocol Failed - 3/6/2024  1:31 PM        Failed - Last A1C < 7.5 and within past 6 months     Lab Results   Component Value Date    A1C 8.7 (A) 02/10/2024             Passed - In person appointment or virtual visit in the past 6 mos or appointment in next 3 mos     Recent Outpatient Visits              3 weeks ago Uncontrolled type 2 diabetes mellitus with hyperglycemia ([facility]    [facility] Visit    3 months ago Type 2 diabetes mellitus without complication, without long-term current use of insulin ([facility])    [facility], [place]    Nurse Only    3 months ago Type 2 diabetes mellitus without complication, without long-term current use of insulin ([facility])    [facility]    [facility] Visit    4 months ago Primary hypertension    [facility]    Office Visit    1 year ago Type 2 diabetes mellitus without complication, without long-term current use of insulin ([facility])    [facility]    [facility]          Future Appointments         Provider Department Appt Notes    In 2 months [facility]               Passed - Microalbumin procedure in past 12 months or taking ACE/ARB        Passed - EGFRCR or GFRNAA > 50     GFR Evaluation  EGFRCR: 68 , resulted on 10/25/2023          Passed - GFR in the past 12 months           Future Appointments         Provider Department Appt Notes    In 2 months Arsenio Sidhu  [facility]          Recent Outpatient Visits              3 weeks ago Uncontrolled type 2 diabetes mellitus with hyperglycemia [facility]    [facility]    3 months ago Type 2 diabetes mellitus without complication, without long-term current use of insulin (HCC)    [facility], [place]    Nurse Only    3 months ago Type 2 diabetes mellitus without complication, without long-term current use of insulin ([facility]    Office Visit    4 months ago Primary hypertension    [facility] [place] Sanaz Khan DO    Office Visit    1 year ago Type 2 diabetes mellitus without complication, without long-term current use of insulin ([facility])    [facility]    [facility] Visit

## 2024-03-08 RX ORDER — SEMAGLUTIDE 0.68 MG/ML
INJECTION, SOLUTION SUBCUTANEOUS
Qty: 5 EACH | Refills: 1 | Status: SHIPPED | OUTPATIENT
Start: 2024-03-08

## 2024-03-23 DIAGNOSIS — E11.9 CONTROLLED TYPE 2 DIABETES MELLITUS WITHOUT COMPLICATION, WITHOUT LONG-TERM CURRENT USE OF INSULIN (HCC): ICD-10-CM

## 2024-03-26 NOTE — TELEPHONE ENCOUNTER
Refill passed per [facility] protocol.  Requested Prescriptions   Pending Prescriptions Disp Refills    Glucose Blood (HÉCTOR CONTOUR NEXT TEST) In Vitro Strip 100 strip 3     Sig: Test blood sugar three times per day.       Diabetic Supplies Protocol Passed - 3/23/2024  6:18 PM        Passed - In person appointment or virtual visit in the past 12 mos or appointment in next 3 mos     Recent Outpatient Visits              1 month ago Uncontrolled type 2 diabetes mellitus with hyperglycemia ([facility]    [facility]    4 months ago Type 2 diabetes mellitus without complication, without long-term current use of insulin ([facility]    4 months ago Type 2 diabetes mellitus without complication, without long-term current use of insulin ([facility])    [facility]    Office Visit    5 months ago Primary hypertension    [facility] Sanaz Khan DO    Office Visit    1 year ago Type 2 diabetes mellitus without complication, without long-term current use of insulin ([facility])    [facility]    Office Visit          Future Appointments         Provider Department Appt Notes    In 1 month [facility]                  Future Appointments         Provider Department Appt Notes    In 1 month [facility]          Recent Outpatient Visits              1 month ago Uncontrolled type 2 diabetes mellitus with hyperglycemia (HCC)    [facility]    Office Visit    4 months ago Type 2 diabetes mellitus without complication, without long-term  current use of insulin ([facility])    [facility], [place]    Nurse Only    4 months ago Type 2 diabetes mellitus without complication, without long-term current use of insulin ([facility]    [facility] Visit    5 months ago Primary hypertension    [facility], [facility], [place] Sanaz Khan DO    Office Visit    1 year ago Type 2 diabetes mellitus without complication, without long-term current use of insulin ([facility])    [facility], Arsenio Chiu MD    Office Visit

## 2024-03-26 NOTE — TELEPHONE ENCOUNTER
Refill passed per [facility] protocol.    New branding of strips had to reorder-please sign -pt states he tests 3 times a day.     Requested Prescriptions   Pending Prescriptions Disp Refills    Glucose Blood (CONTOUR NEXT TEST) In Vitro Strip 300 strip 3     Sig: Test blood sugar three times per day.       Diabetic Supplies Protocol Passed - 3/26/2024  9:51 AM        Passed - In person appointment or virtual visit in the past 12 mos or appointment in next 3 mos     Recent Outpatient Visits              1 month ago Uncontrolled type 2 diabetes mellitus with hyperglycemia ([facility]    [facility] Visit    4 months ago Type 2 diabetes mellitus without complication, without long-term current use of insulin ([facility])    [facility] [place]    Nurse Only    4 months ago Type 2 diabetes mellitus without complication, without long-term current use of insulin ([facility]    Office Visit    5 months ago Primary hypertension    [facility] Sanaz Khan DO    Office Visit    1 year ago Type 2 diabetes mellitus without complication, without long-term current use of insulin ([facility])    [facility]    [facility]          Future Appointments         Provider Department Appt Notes    In 1 month [facility]                  Future Appointments         Provider Department Appt Notes    In 1 month [facility]          Recent Outpatient Visits              1 month ago Uncontrolled type 2 diabetes mellitus with hyperglycemia ([facility])    [facility] [address], Arsenio Chiu MD    Office Visit     4 months ago Type 2 diabetes mellitus without complication, without long-term current use of insulin ([facility])    [facility] [place]    Nurse Only    4 months ago Type 2 diabetes mellitus without complication, without long-term current use of insulin ([facility])    [facility], Arsenio Chiu MD    Office Visit    5 months ago Primary hypertension    [facility], [facility] [place] Sanaz Khan DO    Office Visit    1 year ago Type 2 diabetes mellitus without complication, without long-term current use of insulin ([facility])    [facility]    Office Visit

## 2024-03-26 NOTE — TELEPHONE ENCOUNTER
Passes protocol but need SIG clarification ===Gonwayhart message sent.   We will wait for the reply prior to approval.       Refill passed per [facility] protocol.     Requested Prescriptions   Pending Prescriptions Disp Refills    Glucose Blood (HÉCTOR CONTOUR NEXT TEST) In Vitro Strip 100 strip 3     Sig: Test blood sugar three times per day.       Diabetic Supplies Protocol Passed - 3/23/2024  6:18 PM        Passed - In person appointment or virtual visit in the past 12 mos or appointment in next 3 mos     Recent Outpatient Visits              1 month ago Uncontrolled type 2 diabetes mellitus with hyperglycemia (HCC)    [facility], Arsenio Chiu MD    Office Visit    4 months ago Type 2 diabetes mellitus without complication, without long-term current use of insulin (HCC)    [facility]    4 months ago Type 2 diabetes mellitus without complication, without long-term current use of insulin (HCC)    [facility]    [facility] Visit    5 months ago Primary hypertension    [facility] Sanaz Khan DO    Office Visit    1 year ago Type 2 diabetes mellitus without complication, without long-term current use of insulin (HCC)    [facility]    [facility]          Future Appointments         Provider Department Appt Notes    In 1 month [facility]                     Future Appointments         Provider Department Appt Notes    In 1 month [facility]             Recent Outpatient Visits              1 month ago Uncontrolled type 2 diabetes mellitus with hyperglycemia (HCC)    [facility],  Arsenio Chiu MD    Office Visit    4 months ago Type 2 diabetes mellitus without complication, without long-term current use of insulin ([facility])    [facility] [place]    Nurse Only    4 months ago Type 2 diabetes mellitus without complication, without long-term current use of insulin ([facility])    [facility], Arsenio Chiu MD    Office Visit    5 months ago Primary hypertension    [facility]    Office Visit    1 year ago Type 2 diabetes mellitus without complication, without long-term current use of insulin ([facility])    [facility], Arsenio Chiu MD    Office Visit

## 2024-04-20 DIAGNOSIS — E11.65 UNCONTROLLED TYPE 2 DIABETES MELLITUS WITH HYPERGLYCEMIA (HCC): ICD-10-CM

## 2024-04-22 RX ORDER — SITAGLIPTIN AND METFORMIN HYDROCHLORIDE 500; 50 MG/1; MG/1
1 TABLET, FILM COATED ORAL 2 TIMES DAILY WITH MEALS
Qty: 180 TABLET | Refills: 3 | Status: SHIPPED | OUTPATIENT
Start: 2024-04-22

## 2024-04-22 NOTE — TELEPHONE ENCOUNTER
Please review. Rx failed/no protocol.    Requested Prescriptions   Pending Prescriptions Disp Refills    JANUMET  MG Oral Tab [Pharmacy Med Name: JANUMET 50/500MG TABLETS] 180 tablet 0     Sig: TAKE 1 TABLET BY MOUTH TWICE DAILY WITH MEALS       Diabetes Medication Protocol Failed - 4/20/2024  4:06 PM        Failed - Last A1C < 7.5 and within past 6 months     Lab Results   Component Value Date    A1C 8.7 (A) 02/10/2024             Passed - In person appointment or virtual visit in the past 6 mos or appointment in next 3 mos     Recent Outpatient Visits              2 months ago Uncontrolled type 2 diabetes mellitus with hyperglycemia ([facility]    Office Visit    5 months ago Type 2 diabetes mellitus without complication, without long-term current use of insulin ([facility])    [facility] [place]    Nurse Only    5 months ago Type 2 diabetes mellitus without complication, without long-term current use of insulin ([facility]    Office Visit    6 months ago Primary hypertension    [facility]    Office Visit    1 year ago Type 2 diabetes mellitus without complication, without long-term current use of insulin ([facility])    [facility]    [facility] Visit          Future Appointments         Provider Department Appt Notes    In 3 weeks [facility]                    Passed - Microalbumin procedure in past 12 months or taking ACE/ARB        Passed - EGFRCR or GFRNAA > 50     GFR Evaluation  EGFRCR: 68 , resulted on 10/25/2023          Passed - GFR in the past 12 months             Future Appointments         Provider Department Appt Notes    In 3 weeks Nahum  [facility]          Recent Outpatient Visits              2 months ago Uncontrolled type 2 diabetes mellitus with hyperglycemia [facility]    [facility] Visit    5 months ago Type 2 diabetes mellitus without complication, without long-term current use of insulin (HCC)    [facility] [place]    Nurse Only    5 months ago Type 2 diabetes mellitus without complication, without long-term current use of insulin ([facility])    [facility] Arsenio Chiu MD    Office Visit    6 months ago Primary hypertension    [facility] [place] San[place] Khan [place]    Office Visit    1 year ago Type 2 diabetes mellitus without complication, without long-term current use of insulin ([facility])    [facility]    Office Visit

## 2024-05-09 NOTE — TELEPHONE ENCOUNTER
Please review;  [facility] protocol failed/ No protocol     Requested Prescriptions   Pending Prescriptions Disp Refills    Repaglinide 0.5 MG Oral Tab 90 tablet 1     Sig: Take 1 tablet (0.5 mg total) by mouth 3 (three) times daily before meals.       Diabetes Medication Protocol Failed - 5/8/2024  5:29 PM        Failed - Last A1C < 7.5 and within past 6 months     Lab Results   Component Value Date    A1C 8.7 (A) 02/10/2024             Passed - In person appointment or virtual visit in the past 6 mos or appointment in next 3 mos     Recent Outpatient Visits              2 months ago Uncontrolled type 2 diabetes mellitus with hyperglycemia (HCC)    [facility] [place] [facility]    Office Visit    6 months ago Type 2 diabetes mellitus without complication, without long-term current use of insulin (HCC)    [facility]    Nurse Only    6 months ago Type 2 diabetes mellitus without complication, without long-term current use of insulin ([facility])    [facility] [facility] [place]    Office Visit    6 months ago Primary hypertension    [facility] [place] Sanaz Khan DO    Office Visit    1 year ago Type 2 diabetes mellitus without complication, without long-term current use of insulin ([facility])    [facility] [facility] [place]    Office Visit          Future Appointments         Provider Department Appt Notes    In 1 week Arsenio Sidhu MD [facility]                    Passed - Microalbumin procedure in past 12 months or taking ACE/ARB        Passed - EGFRCR or GFRNAA > 50     GFR Evaluation  EGFRCR: 68 , resulted on 10/25/2023          Passed - GFR in the past 12 months           Future Appointments         Provider Department Appt Notes    In 1  Arsenio Smyth MD [facility] [place] F/u          Recent Outpatient Visits              2 months ago Uncontrolled type 2 diabetes mellitus with hyperglycemia ([facility])    [facility]    Office Visit    6 months ago Type 2 diabetes mellitus without complication, without long-term current use of insulin ([facility])    [facility] [place]    Nurse Only    6 months ago Type 2 diabetes mellitus without complication, without long-term current use of insulin ([facility])    [facility], Arsenio Chiu MD    Office Visit    6 months ago Primary hypertension    [facility] [place] Sanaz Khan DO    Office Visit    1 year ago Type 2 diabetes mellitus without complication, without long-term current use of insulin ([facility])    [facility]    [facility] Visit

## 2024-05-10 RX ORDER — REPAGLINIDE 0.5 MG/1
0.5 TABLET ORAL
Qty: 270 TABLET | Refills: 1 | Status: SHIPPED | OUTPATIENT
Start: 2024-05-10

## 2024-05-13 ENCOUNTER — APPOINTMENT (OUTPATIENT)
Dept: GENERAL RADIOLOGY | Age: 39
End: 2024-05-13
Attending: PHYSICIAN ASSISTANT

## 2024-05-13 ENCOUNTER — HOSPITAL ENCOUNTER (OUTPATIENT)
Age: 39
Discharge: HOME | End: 2024-05-13

## 2024-05-13 VITALS
TEMPERATURE: 98 F | RESPIRATION RATE: 18 BRPM | HEART RATE: 86 BPM | DIASTOLIC BLOOD PRESSURE: 70 MMHG | SYSTOLIC BLOOD PRESSURE: 122 MMHG | OXYGEN SATURATION: 98 %

## 2024-05-13 DIAGNOSIS — J40 BRONCHITIS: Primary | ICD-10-CM

## 2024-05-13 DIAGNOSIS — K21.9 GASTROESOPHAGEAL REFLUX DISEASE, UNSPECIFIED WHETHER ESOPHAGITIS PRESENT: ICD-10-CM

## 2024-05-13 PROCEDURE — 71046 X-RAY EXAM CHEST 2 VIEWS: CPT | Performed by: PHYSICIAN ASSISTANT

## 2024-05-13 PROCEDURE — 99203 OFFICE O/P NEW LOW 30 MIN: CPT | Performed by: PHYSICIAN ASSISTANT

## 2024-05-13 RX ORDER — ALBUTEROL SULFATE 90 UG/1
2 AEROSOL, METERED RESPIRATORY (INHALATION) EVERY 4 HOURS PRN
Qty: 1 EACH | Refills: 0 | Status: SHIPPED | OUTPATIENT
Start: 2024-05-13 | End: 2024-06-12

## 2024-05-13 RX ORDER — OMEPRAZOLE 20 MG/1
20 CAPSULE, DELAYED RELEASE ORAL DAILY
Qty: 30 CAPSULE | Refills: 0 | Status: SHIPPED | OUTPATIENT
Start: 2024-05-13 | End: 2024-06-12

## 2024-05-13 NOTE — ED PROVIDER NOTES
Chief Complaint   Patient presents with    Breathing Problem       HPI:     Abdullahi Mtz is a 38 year old male who presents ration of intermittent cough x 2 months.  Patient states symptoms worse in the last few days with periodic wheeze worse with laying flat over the last few weeks with changes in voice.  Denies history of GERD or previous evaluation including PPI or H2 antagonist.  Denies recent travel history self or family history hypercoagulable state.  Denies associated fever headache dizziness neck pain chest pain shortness of breath abdominal pain vomiting diarrhea dysuria or rash ue/le swelling or weakness.       PFSH    PFSH asessment screens reviewed and agree.  Nurses notes reviewed I agree with documentation.    Family History   Problem Relation Age of Onset    Gastro-Intestinal Disorder Father         diverticulosis    Hypertension Mother     Stroke Mother         cerebrovascular accident (TIA's)    Dementia Maternal Grandfather     Other (Other) Maternal Grandmother         HD ?     Family history reviewed with patient/caregiver and is not pertinent to presenting problem.  Social History     Socioeconomic History    Marital status:      Spouse name: Not on file    Number of children: Not on file    Years of education: Not on file    Highest education level: Not on file   Occupational History    Not on file   Tobacco Use    Smoking status: Former     Current packs/day: 0.25     Types: Cigarettes    Smokeless tobacco: Never   Vaping Use    Vaping status: Never Used   Substance and Sexual Activity    Alcohol use: Yes     Alcohol/week: 0.0 standard drinks of alcohol    Drug use: No    Sexual activity: Not on file   Other Topics Concern    Not on file   Social History Narrative    Not on file     Social Determinants of Health     Financial Resource Strain: Not on file   Food Insecurity: Not on file   Transportation Needs: Not on file   Physical Activity: Not on file   Stress: Not on file    Social Connections: Not on file   Housing Stability: Not on file         ROS:   Positive for stated complaint: Congestion and cough  All other systems reviewed and negative except as noted above.  Constitutional and Vital Signs Reviewed.      Physical Exam:     Findings:    /70   Pulse 86   Temp 98.1 °F (36.7 °C) (Temporal)   Resp 18   SpO2 98%   GENERAL: well developed, well nourished, well hydrated, no distress  SKIN: good skin turgor, no obvious rashes  NECK: No JVD.  Supple, no adenopathy  CARDIO: RRR without murmur  EXTREMITIES: no cyanosis or edema. BROWN without difficulty  GI: soft, non-tender, normal bowel sounds  HEAD: normocephalic, atraumatic  EYES: sclera non icteric bilateral, conjunctiva clear  EARS: TMs clear bilaterally. Canals clear.  NOSE: nasal turbinates: pink, normal mucosa  THROAT: clear, without exudates, uvula midline, and airway patent  LUNGS: Scant expiratory wheeze along the left base.  No retractions.  Clear to auscultation bilaterally; no rales, rhonchi, or wheezes  NEURO: No focal deficits  PSYCH: Alert and oriented x3.  Answering questions appropriately.  Mood appropriate.    MDM/Assessment/Plan:   Orders for this encounter:    Orders Placed This Encounter    XR CHEST PA + LAT CHEST (DOP=81466)     Order Specific Question:   What is the Relevant Clinical Indication / Reason for Exam?     Answer:   COUGH 2 MONTHS WHEEZE R/O INFILTRATE     Order Specific Question:   Release to patient     Answer:   Immediate    albuterol 108 (90 Base) MCG/ACT Inhalation Aero Soln     Sig: Inhale 2 puffs into the lungs every 4 (four) hours as needed for Wheezing.     Dispense:  1 each     Refill:  0    omeprazole 20 MG Oral Capsule Delayed Release     Sig: Take 1 capsule (20 mg total) by mouth daily.     Dispense:  30 capsule     Refill:  0       Labs performed this visit:  No results found for this or any previous visit (from the past 10 hour(s)).    MDM:      X-ray without infiltrate vascular  congestion or effusion. , patient exam and history suggestive of otitis with secondary GERD.  Patient vocation as a  instructed potential concerns based on symptoms of possible pulmonary embolism yet exam and history not highly reflective of those concerns yet instructed on precautions and changes warranting further workup prior to scheduled reevaluation with Dr. Sidhu 5 days from now.  Happy With plan of care.  PERC negative.    Diagnosis:    ICD-10-CM    1. Bronchitis  J40 XR CHEST PA + LAT CHEST (CPT=71046)     XR CHEST PA + LAT CHEST (CPT=71046)      2. Gastroesophageal reflux disease, unspecified whether esophagitis present  K21.9           All results reviewed and discussed with patient.  See AVS for detailed discharge instructions for your condition today.    Follow Up with:  Arsenio Sidhu MD  [address] [place] [address] [id number] [phone]    Schedule an appointment as soon as possible for a visit in 5 days  AS SCHEDULED. GO TO ER FOR BREAKTHROUGH CHANGES AS INSTRUCTED.

## 2024-05-18 ENCOUNTER — OFFICE VISIT (OUTPATIENT)
Dept: FAMILY MEDICINE CLINIC | Facility: CLINIC | Age: 39
End: 2024-05-18

## 2024-05-18 VITALS
DIASTOLIC BLOOD PRESSURE: 76 MMHG | HEART RATE: 81 BPM | HEIGHT: 71 IN | SYSTOLIC BLOOD PRESSURE: 126 MMHG | WEIGHT: 220 LBS | BODY MASS INDEX: 30.8 KG/M2

## 2024-05-18 DIAGNOSIS — R06.00 DYSPNEA, UNSPECIFIED TYPE: ICD-10-CM

## 2024-05-18 DIAGNOSIS — E11.9 TYPE 2 DIABETES MELLITUS WITHOUT COMPLICATION, WITHOUT LONG-TERM CURRENT USE OF INSULIN (HCC): Primary | ICD-10-CM

## 2024-05-18 DIAGNOSIS — B35.9 TINEA: ICD-10-CM

## 2024-05-18 LAB — HEMOGLOBIN A1C: 5.8 % (ref 4.3–5.6)

## 2024-05-18 PROCEDURE — 3044F HG A1C LEVEL LT 7.0%: CPT | Performed by: FAMILY MEDICINE

## 2024-05-18 PROCEDURE — 3078F DIAST BP <80 MM HG: CPT | Performed by: FAMILY MEDICINE

## 2024-05-18 PROCEDURE — 3008F BODY MASS INDEX DOCD: CPT | Performed by: FAMILY MEDICINE

## 2024-05-18 PROCEDURE — 3074F SYST BP LT 130 MM HG: CPT | Performed by: FAMILY MEDICINE

## 2024-05-18 PROCEDURE — 99214 OFFICE O/P EST MOD 30 MIN: CPT | Performed by: FAMILY MEDICINE

## 2024-05-18 PROCEDURE — 83036 HEMOGLOBIN GLYCOSYLATED A1C: CPT | Performed by: FAMILY MEDICINE

## 2024-05-18 RX ORDER — SEMAGLUTIDE 0.68 MG/ML
INJECTION, SOLUTION SUBCUTANEOUS
Qty: 5 EACH | Refills: 1 | Status: SHIPPED | OUTPATIENT
Start: 2024-05-18

## 2024-05-18 RX ORDER — CLOTRIMAZOLE AND BETAMETHASONE DIPROPIONATE 10; .64 MG/G; MG/G
1 CREAM TOPICAL 2 TIMES DAILY PRN
Qty: 60 G | Refills: 0 | Status: SHIPPED | OUTPATIENT
Start: 2024-05-18

## 2024-05-18 NOTE — PROGRESS NOTES
5/18/2024  8:33 AM    Abdullahi Mtz is a 38 year old male.    Chief complaint(s):   Chief Complaint   Patient presents with    Diabetes    Breathing Problem     Was seen in urgent care for SOB/wheezing , was given inhaler and anti-reflux medication     Derm Problem     Brown spot on his umbilical area use neosporin with no relief      HPI:     Abdullahi Mtz primary complaint is regarding multiple complaints.     Patient Abdullahi Mtz is a 38 year old male is here to be evaluated for type 2 diabetes.  Specifically, male has type 2, insulin none requiring diabetes. Compliance with treatment has been fair.  Patient's diabetes was first diagnosed 2017.  Patient follows a 2000 calorie ADA diet.  Patient report experiencing the following diabetes related symptoms; Negative for polyuria, Negative for polydipsia, Positive for blurred vision.  Depression symptoms include none.  Tobacco screen: none  smoker.  Current meds include :  oral hypoglycemic include: Janumet  mg BID STOPPED today, Prandin 0.5 mg TID  insulin/injectable : Ozempic  0.5 mg Q week .  Hypoglycemia severity is not applicable.he reports home blood glucose readings have been [phone] (#s) and believes  having fair glucose control.  Most recent lab results include glycohemoglobin 8.7 %, microalbuminuria have been negative.  In regard to preventative care, his last ophthalmology exam was in > 12 months ago.  Opthalmic evaluation have shown none pathology.  Concurrent relative health problems include HTN, elevated LFTs and hyperlipidemia.      Abdullahi Mtz is a 38 year old male presents with hypertension.  This was first diagnosed in 2015.  Current nonpharmacologic treatment includes low sodium diet, exercise, and meditation.  His current cardiac medication(s) regimen includes: Amlodipine 10mg & Olmesartan 40 mg, Chlolrthalidone 25 mg .  He has kept a blood pressure diary, but states that his blood pressures have been well controll.  he is  tolerating his medication(s)  well without side effects.      In addition he is complaining of a shortness of breath which gets worse when he is lying back.  There has been some whistling with breathing.  He was seen at the urgent care center and was prescribed albuterol and is feeling better.  Denies any history of asthma.  He is a non-smoker.  Also denies any lower extremity swelling, chest pain or chest pressure, palpitations.    Furthermore he is complaining of a periumbilical rash associated with minimal itchiness.  This has been going on for a few months now.    HISTORY:  Past Medical History:    Dog bite of face    Essential hypertension    Hyperlipidemia    Renal stone    no surgical interventions were required, self expeled      Past Surgical History:   Procedure Laterality Date    Other surgical history  age 2    facial plastic surgery 2nd to dog bite      Family History   Problem Relation Age of Onset    Gastro-Intestinal Disorder Father         diverticulosis    Hypertension Mother     Stroke Mother         cerebrovascular accident (TIA's)    Dementia Maternal Grandfather     Other (Other) Maternal Grandmother         HD ?      Social History:   Social History     Socioeconomic History    Marital status:    Tobacco Use    Smoking status: Former     Current packs/day: 0.25     Types: Cigarettes    Smokeless tobacco: Never   Vaping Use    Vaping status: Never Used   Substance and Sexual Activity    Alcohol use: Yes     Alcohol/week: 0.0 standard drinks of alcohol    Drug use: No        Immunizations:   Immunization History   Administered Date(s) Administered    Covid-19 Vaccine Pfizer 30 mcg/0.3 ml 04/16/2021, 05/10/2021    Covid-19 Vaccine Pfizer Eduardo-Sucrose 30 mcg/0.3 ml 02/09/2022    FLULAVAL 6 months & older 0.5 ml Prefilled syringe ([id number]) 11/12/2021, 11/19/2022, 10/19/2023    TDAP 12/17/2015       Medications (Active prior to today's visit):  Current Outpatient Medications   Medication Sig  Dispense Refill    clotrimazole-betamethasone 1-0.05 % External Cream Apply 1 Application topically 2 (two) times daily as needed. 60 g 0    semaglutide (OZEMPIC, 0.25 OR 0.5 MG/DOSE,) 2 MG/3ML Subcutaneous Solution Pen-injector Administer 0.5 mg Q week 5 each 1    albuterol 108 (90 Base) MCG/ACT Inhalation Aero Soln Inhale 2 puffs into the lungs every 4 (four) hours as needed for Wheezing. 1 each 0    omeprazole 20 MG Oral Capsule Delayed Release Take 1 capsule (20 mg total) by mouth daily. 30 capsule 0    Repaglinide 0.5 MG Oral Tab Take 1 tablet (0.5 mg total) by mouth 3 (three) times daily before meals. 270 tablet 1    Glucose Blood (CONTOUR NEXT TEST) In Vitro Strip Test blood sugar three times per day. 300 strip 3    amLODIPine-Olmesartan 10-40 MG Oral Tab Take 1 tablet by mouth daily. 90 tablet 3    atorvastatin 40 MG Oral Tab Take 1 tablet (40 mg total) by mouth at bedtime. 90 tablet 3    chlorthalidone 25 MG Oral Tab Take 1 tablet (25 mg total) by mouth daily. 90 tablet 3    MICROLET LANCETS Does not apply Misc TEST BLOOD SUGAR THREE TIMES DAILY 100 each 3       Allergies:  Allergies   Allergen Reactions    Seasonal OTHER (SEE COMMENTS)     Watery eyes, sneezing, itchy throat         ROS:   Review of Systems   Constitutional:  Negative for appetite change, fatigue and fever.   Eyes:  Negative for visual disturbance.   Respiratory:  Positive for shortness of breath and wheezing. Negative for cough.    Cardiovascular:  Negative for chest pain, palpitations and leg swelling.   Gastrointestinal:  Negative for abdominal pain.   Endocrine: Negative for polydipsia and polyuria.   Musculoskeletal:  Negative for myalgias.   Skin:  Positive for rash.   Neurological:  Negative for dizziness, weakness and headaches.       PHYSICAL EXAM:   VS: /76 (BP Location: Right arm, Patient Position: Sitting, Cuff Size: adult)   Pulse 81   Ht 5' 11\" (1.803 m)   Wt 220 lb (99.8 kg)   BMI 30.68 kg/m²     Physical  Exam  Vitals reviewed.   Constitutional:       Appearance: Normal appearance. He is well-developed.   HENT:      Head: Normocephalic.   Eyes:      General: No scleral icterus.     Conjunctiva/sclera: Conjunctivae normal.   Cardiovascular:      Rate and Rhythm: Normal rate and regular rhythm.      Heart sounds: Normal heart sounds.   Pulmonary:      Effort: Pulmonary effort is normal.      Breath sounds: Rhonchi present.   Musculoskeletal:      Cervical back: Neck supple.   Skin:     Findings: Rash (umbilical) present. Rash is macular.   Psychiatric:         Mood and Affect: Mood normal.         LABORATORY RESULTS:   No results found for: \"URCOLOR\", \"URCLA\", \"URINELEUK\", \"URINENITRITE\", \"URINEBLOOD\"   Results for orders placed or performed in visit on 05/18/24   POC Glycohemoglobin [15315]   Result Value Ref Range    HEMOGLOBIN A1C 5.8 (A) 4.3 - 5.6 %    Cartridge Lot# [id number] Numeric    Cartridge Expiration Date 1/24/26 Date     EKG / Spirometry : -     Radiology: XR CHEST PA + LAT CHEST (CPT=71046)    Result Date: 5/13/2024  PROCEDURE: XR CHEST PA + LAT CHEST (CPT=71046)  COMPARISON: None.  INDICATIONS: Congestion, wheezing and intermittent retrosternal chest pain x2 months. History of hypertension and diabetes.  TECHNIQUE:   Two views.   FINDINGS: CARDIAC/VASC: Normal.  No cardiac silhouette abnormality or cardiomegaly.  Unremarkable pulmonary vasculature.  MEDIAST/TACOS: No visible mass or adenopathy. LUNGS/PLEURA: Normal.  No significant pulmonary parenchymal abnormalities.  No effusion or pleural thickening.  BONES: No fracture or visible bony lesion. OTHER: Negative.          CONCLUSION: Normal examination.     Dictated by (CST): Amarjit Brock MD on 5/13/2024 at 6:46 PM     Finalized by (CST): Amarjit Brock MD on 5/13/2024 at 6:47 PM             ASSESSMENT/PLAN:   Assessment   Encounter Diagnoses   Name Primary?    Type 2 diabetes mellitus without complication, without long-term current use of insulin  ([facility]) Yes    Dyspnea, unspecified type     Tinea      1. Type 2 diabetes mellitus without complication, without long-term current use of insulin ([facility])    DIABETES A&P    LABORATORY & ORDERS: Blood test(s) ordered today ;   Orders Placed This Encounter   Procedures    POC Glycohemoglobin [04340]     Additional orders include:   MEDICATIONS:    clotrimazole-betamethasone 1-0.05 % External Cream, Apply 1 Application topically 2 (two) times daily as needed., Disp: 60 g, Rfl: 0    semaglutide (OZEMPIC, 0.25 OR 0.5 MG/DOSE,) 2 MG/3ML Subcutaneous Solution Pen-injector, Administer 0.5 mg Q week, Disp: 5 each, Rfl: 1    albuterol 108 (90 Base) MCG/ACT Inhalation Aero Soln, Inhale 2 puffs into the lungs every 4 (four) hours as needed for Wheezing., Disp: 1 each, Rfl: 0    omeprazole 20 MG Oral Capsule Delayed Release, Take 1 capsule (20 mg total) by mouth daily., Disp: 30 capsule, Rfl: 0    Repaglinide 0.5 MG Oral Tab, Take 1 tablet (0.5 mg total) by mouth 3 (three) times daily before meals., Disp: 270 tablet, Rfl: 1    Glucose Blood (CONTOUR NEXT TEST) In Vitro Strip, Test blood sugar three times per day., Disp: 300 strip, Rfl: 3    amLODIPine-Olmesartan 10-40 MG Oral Tab, Take 1 tablet by mouth daily., Disp: 90 tablet, Rfl: 3    atorvastatin 40 MG Oral Tab, Take 1 tablet (40 mg total) by mouth at bedtime., Disp: 90 tablet, Rfl: 3    chlorthalidone 25 MG Oral Tab, Take 1 tablet (25 mg total) by mouth daily., Disp: 90 tablet, Rfl: 3    MICROLET LANCETS Does not apply Misc, TEST BLOOD SUGAR THREE TIMES DAILY, Disp: 100 each, Rfl: 3.  Requested Prescriptions     Signed Prescriptions Disp Refills    clotrimazole-betamethasone 1-0.05 % External Cream 60 g 0     Sig: Apply 1 Application topically 2 (two) times daily as needed.    semaglutide (OZEMPIC, 0.25 OR 0.5 MG/DOSE,) 2 MG/3ML Subcutaneous Solution Pen-injector 5 each 1     Sig: Administer 0.5 mg Q week      REFERRALS:       Procedures    POC Glycohemoglobin [02591]      RECOMMENDATIONS: instructed in use of glucometer ( check fasting glucose multiple times a day), return for training in administering insulin injections, adherence to an 1800 calorie ADA diet,  5 pound weight loss, a graduated exercise program, HgbA1C level checked quarterly, daily foot self-inspection, need for yearly flu shots, and avoid all sodas, juices, candy, chocolates, cakes, ice cream, etc.      FOLLOW-UP: Schedule a follow-up visit in 6 months.       2. Dyspnea, unspecified type            LABORATORY & ORDERS: Spirometry   Complexity of today's visit include:  (material ordered by another provider)  reviewed  *Reviewed - lab:                      - Radiology reports:CXR                     RECOMMENDATIONS given include: Patient was reassured of  his medical condition and all questions and concerns were answered. Patient was informed to please, call our office with any new or further questions or concerns that may come up in the near future. Notify Dr Sidhu or the [facility] if there is a deterioration or worsening of the medical condition. Also, inform the doctor with any new symptoms or medications' side effects.      FOLLOW-UP: Schedule a follow-up visit in 3 weeks.         3. Tinea    MEDICATIONS:     Requested Prescriptions     Signed Prescriptions Disp Refills    clotrimazole-betamethasone 1-0.05 % External Cream 60 g 0     Sig: Apply 1 Application topically 2 (two) times daily as needed.   RECOMMENDATIONS given include: Patient was reassured of  his medical condition and all questions and concerns were answered. Patient was informed to please, call our office with any new or further questions or concerns that may come up in the near future. Notify Dr Sidhu or the [facility] if there is a deterioration or worsening of the medical condition. Also, inform the doctor with any new symptoms or medications' side effects.      FOLLOW-UP: Schedule a follow-up visit in  prn.              Orders This Visit:  Orders Placed This Encounter   Procedures    POC Glycohemoglobin [[id number]]       Meds This Visit:  Requested Prescriptions     Signed Prescriptions Disp Refills    clotrimazole-betamethasone 1-0.05 % External Cream 60 g 0     Sig: Apply 1 Application topically 2 (two) times daily as needed.    semaglutide (OZEMPIC, 0.25 OR 0.5 MG/DOSE,) 2 MG/3ML Subcutaneous Solution Pen-injector 5 each 1     Sig: Administer 0.5 mg Q week       Imaging & Referrals:  None         RANDOLPH RUDD MD

## 2024-05-23 ENCOUNTER — ORDER TRANSCRIPTION (OUTPATIENT)
Dept: ADMINISTRATIVE | Facility: HOSPITAL | Age: 39
End: 2024-05-23

## 2024-05-23 DIAGNOSIS — R06.00 DYSPNEA, UNSPECIFIED TYPE: Primary | ICD-10-CM

## 2024-05-28 ENCOUNTER — HOSPITAL ENCOUNTER (OUTPATIENT)
Dept: RESPIRATORY THERAPY | Facility: HOSPITAL | Age: 39
Discharge: HOME | End: 2024-05-28
Attending: FAMILY MEDICINE

## 2024-05-28 DIAGNOSIS — R06.00 DYSPNEA, UNSPECIFIED TYPE: ICD-10-CM

## 2024-05-28 PROCEDURE — 94060 EVALUATION OF WHEEZING: CPT | Performed by: INTERNAL MEDICINE

## 2024-05-28 NOTE — PROCEDURES
[facility]  part of [facility]     Pulmonary Function Test     Abdullahi Mtz Patient Status:  Outpatient    1985 MRN [id number]   Date of Exam 24 PCP RANDOLPH RUDD MD           Spirometry   FEV1: 4.30 104%  FVC: 5.85 114%  FEV1/FVC: 0.74    Lung Volume   TLC:   RV :     Diffusion Capacity   DLCO:     Flow Volume Loop       Impression   No evidence of obstructive defect seen without significant postbronchodilator response observed.    See Cabezas DO  Pulmonary Critical Care Medicine  [facility]

## 2024-05-30 ENCOUNTER — HOSPITAL ENCOUNTER (OUTPATIENT)
Age: 39
Discharge: HOME | End: 2024-05-30

## 2024-05-30 VITALS
OXYGEN SATURATION: 98 % | TEMPERATURE: 98 F | HEART RATE: 97 BPM | DIASTOLIC BLOOD PRESSURE: 75 MMHG | SYSTOLIC BLOOD PRESSURE: 135 MMHG | RESPIRATION RATE: 18 BRPM

## 2024-05-30 DIAGNOSIS — Z20.822 ENCOUNTER FOR LABORATORY TESTING FOR COVID-19 VIRUS: ICD-10-CM

## 2024-05-30 DIAGNOSIS — R05.9 COUGH, UNSPECIFIED TYPE: Primary | ICD-10-CM

## 2024-05-30 DIAGNOSIS — R06.00 DYSPNEA, UNSPECIFIED TYPE: ICD-10-CM

## 2024-05-30 LAB — SARS-COV-2 RNA RESP QL NAA+PROBE: NOT DETECTED

## 2024-05-30 PROCEDURE — 99213 OFFICE O/P EST LOW 20 MIN: CPT | Performed by: PHYSICIAN ASSISTANT

## 2024-05-30 PROCEDURE — U0002 COVID-19 LAB TEST NON-CDC: HCPCS | Performed by: PHYSICIAN ASSISTANT

## 2024-05-30 PROCEDURE — 94640 AIRWAY INHALATION TREATMENT: CPT | Performed by: PHYSICIAN ASSISTANT

## 2024-05-30 RX ORDER — PREDNISONE 20 MG/1
40 TABLET ORAL DAILY
Qty: 10 TABLET | Refills: 0 | Status: SHIPPED | OUTPATIENT
Start: 2024-05-30 | End: 2024-06-04

## 2024-05-30 RX ORDER — BENZONATATE 100 MG/1
100 CAPSULE ORAL 3 TIMES DAILY PRN
Qty: 30 CAPSULE | Refills: 0 | Status: SHIPPED | OUTPATIENT
Start: 2024-05-30 | End: 2024-06-29

## 2024-05-30 RX ORDER — CODEINE PHOSPHATE AND GUAIFENESIN 10; 100 MG/5ML; MG/5ML
5 SOLUTION ORAL EVERY 6 HOURS PRN
Qty: 50 ML | Refills: 0 | Status: SHIPPED | OUTPATIENT
Start: 2024-05-30

## 2024-05-30 RX ORDER — ALBUTEROL SULFATE 90 UG/1
2 AEROSOL, METERED RESPIRATORY (INHALATION) EVERY 4 HOURS PRN
Qty: 1 EACH | Refills: 0 | Status: SHIPPED | OUTPATIENT
Start: 2024-05-30 | End: 2024-06-29

## 2024-05-30 RX ORDER — IPRATROPIUM BROMIDE AND ALBUTEROL SULFATE 2.5; .5 MG/3ML; MG/3ML
3 SOLUTION RESPIRATORY (INHALATION) ONCE
Status: COMPLETED | OUTPATIENT
Start: 2024-05-30 | End: 2024-05-30

## 2024-05-30 NOTE — ED INITIAL ASSESSMENT (HPI)
Pt reports cough and shortness of breath, which started 2 months ago. Pt was seen 1.5 weeks ago, saw primary care physician, did pulmonary testing on Tuesday. Next appt with primary doctor is next week. Patient reports that the cough is now causing headaches. Pt using albuterol inhaler PRN, every 4 hours, without relief.

## 2024-05-30 NOTE — ED PROVIDER NOTES
Patient Seen in: [facility]    History     Chief Complaint   Patient presents with    Cough     Stated Complaint: Cough    HPI    Abdullahi Mtz is a 38 year old male presents with chief complaint of cough.  Onset 2 months ago.  Patient reports associated intermittent wheeze and dyspnea.  Patient states he is followed by his PCP for these symptoms but cough became more frequent today.  Patient using albuterol inhaler every 4 hours without relief.  Patient denies fever, chills, earache, nasal drainage, sore throat, abdominal pain, nausea, vomiting, diarrhea, constipation, dysuria, hematuria, flank pain, rash, neck pain, neck swelling, restricted neck movement, hemoptysis, chest pain, extremity pain, extremity swelling.      Past Medical History:    Dog bite of face    Essential hypertension    Hyperlipidemia    Renal stone    no surgical interventions were required, self expeled       Past Surgical History:   Procedure Laterality Date    Other surgical history  age 2    facial plastic surgery 2nd to dog bite            Family History   Problem Relation Age of Onset    Gastro-Intestinal Disorder Father         diverticulosis    Hypertension Mother     Stroke Mother         cerebrovascular accident (TIA's)    Dementia Maternal Grandfather     Other (Other) Maternal Grandmother         HD ?       Social History     Socioeconomic History    Marital status:    Tobacco Use    Smoking status: Former     Current packs/day: 0.25     Types: Cigarettes    Smokeless tobacco: Never   Vaping Use    Vaping status: Never Used   Substance and Sexual Activity    Alcohol use: Yes     Alcohol/week: 0.0 standard drinks of alcohol    Drug use: No       Review of Systems    Positive for stated complaint: Cough  Other systems are as noted in HPI.  Constitutional and vital signs reviewed.      All other systems reviewed and negative except as noted above.    PSFH elements reviewed from today and agreed except as  otherwise stated in HPI.    Physical Exam     ED Triage Vitals [05/30/24 1734]   /75   Pulse 97   Resp 18   Temp 98.3 °F (36.8 °C)   Temp src Temporal   SpO2 98 %   O2 Device None (Room air)       Current:/75   Pulse 97   Temp 98.3 °F (36.8 °C) (Temporal)   Resp 18   SpO2 98%     PULSE OX within normal limits on room air as interpreted by this provider.     Constitutional: The patient is cooperative. Appears well-developed and well-nourished.  No acute distress.  Psychological: Alert, No abnormalities of mood, affect.  Head: Normocephalic/atraumatic.   Eyes: Pupils are equal round reactive to light.  Conjunctiva are within normal limits.  ENT: Pharynx noninjected.  Tonsils within normal size limits bilaterally.  No tonsillar exudates.  TMs within normal limits bilaterally.  Mucous membranes moist.  Neck: The neck is supple.  Nontender.  No meningeal signs.  Chest: There is no tenderness to the chest wall.  No CVA tenderness bilaterally.  Respiratory: Respiratory effort was normal.  Slight expiratory wheeze bilaterally.  There is no stridor.  Air entry is equal.  Cardiovascular: Regular rate and rhythm.  Capillary refill is brisk.    Lymphatic: No gross lymphadenopathy noted.  Musculoskeletal: Musculoskeletal system is grossly intact.  There is no obvious deformity.  Neurological: Gross motor movement is intact in all 4 extremities.  Patient exhibits normal speech.  Skin: Skin is normal to inspection.  Warm and dry.  No obvious bruising.  No obvious rash.        ED Course     Labs Reviewed   RAPID SARS-COV-2 BY PCR - Normal       MDM     Patient declined further testing including but not limited to EKG, blood work, imaging.    PERC negative.    COVID-19 negative.    Lungs clear to auscultation bilaterally without wheeze, rales or rhonchi prior to discharge.  Remainder of physical exam remained stable as previously documented.  Patient reports improvement of symptoms prior to discharge.  Results  reviewed with patient.    I have given the patient instructions regarding their diagnoses, expectations, follow up, and ER precautions. I explained to the patient that emergent conditions may arise and to go to the ER for new, worsening or any persistent conditions. I've explained the importance of following up with their doctor as instructed. The patient verbalized understanding of the discharge instructions and plan.        Disposition and Plan     Clinical Impression:  1. Encounter for laboratory testing for COVID-19 virus        Disposition:  There is no disposition on file for this visit.    Follow-up:  No follow-up provider specified.    Medications Prescribed:  Current Discharge Medication List

## 2024-06-05 ENCOUNTER — LAB ENCOUNTER (OUTPATIENT)
Dept: LAB | Age: 39
End: 2024-06-05
Attending: FAMILY MEDICINE
Payer: COMMERCIAL

## 2024-06-05 ENCOUNTER — OFFICE VISIT (OUTPATIENT)
Dept: FAMILY MEDICINE CLINIC | Facility: CLINIC | Age: 39
End: 2024-06-05
Payer: COMMERCIAL

## 2024-06-05 VITALS
DIASTOLIC BLOOD PRESSURE: 71 MMHG | HEART RATE: 81 BPM | WEIGHT: 221.19 LBS | BODY MASS INDEX: 30.97 KG/M2 | HEIGHT: 71 IN | SYSTOLIC BLOOD PRESSURE: 120 MMHG

## 2024-06-05 DIAGNOSIS — J45.30 MILD PERSISTENT REACTIVE AIRWAY DISEASE WITHOUT COMPLICATION (HCC): ICD-10-CM

## 2024-06-05 DIAGNOSIS — J45.30 MILD PERSISTENT REACTIVE AIRWAY DISEASE WITHOUT COMPLICATION (HCC): Primary | ICD-10-CM

## 2024-06-05 PROCEDURE — 82785 ASSAY OF IGE: CPT

## 2024-06-05 PROCEDURE — 3008F BODY MASS INDEX DOCD: CPT | Performed by: FAMILY MEDICINE

## 2024-06-05 PROCEDURE — 36415 COLL VENOUS BLD VENIPUNCTURE: CPT

## 2024-06-05 PROCEDURE — 86003 ALLG SPEC IGE CRUDE XTRC EA: CPT

## 2024-06-05 PROCEDURE — 99214 OFFICE O/P EST MOD 30 MIN: CPT | Performed by: FAMILY MEDICINE

## 2024-06-05 PROCEDURE — 3078F DIAST BP <80 MM HG: CPT | Performed by: FAMILY MEDICINE

## 2024-06-05 PROCEDURE — 3074F SYST BP LT 130 MM HG: CPT | Performed by: FAMILY MEDICINE

## 2024-06-05 RX ORDER — ALBUTEROL SULFATE 2.5 MG/3ML
1.25 SOLUTION RESPIRATORY (INHALATION) EVERY 4 HOURS PRN
Qty: 25 EACH | Refills: 3 | Status: SHIPPED | OUTPATIENT
Start: 2024-06-05 | End: 2024-06-19

## 2024-06-05 RX ORDER — NEBULIZER ACCESSORIES
KIT MISCELLANEOUS
Qty: 1 EACH | Refills: 0 | Status: SHIPPED | OUTPATIENT
Start: 2024-06-05

## 2024-06-05 RX ORDER — ALBUTEROL SULFATE 90 UG/1
2 AEROSOL, METERED RESPIRATORY (INHALATION) EVERY 6 HOURS PRN
Qty: 1 EACH | Refills: 1 | Status: SHIPPED | OUTPATIENT
Start: 2024-06-05

## 2024-06-05 RX ORDER — FLUTICASONE PROPIONATE AND SALMETEROL XINAFOATE 230; 21 UG/1; UG/1
1 AEROSOL, METERED RESPIRATORY (INHALATION) 2 TIMES DAILY
Qty: 1 EACH | Refills: 3 | Status: SHIPPED | OUTPATIENT
Start: 2024-06-05

## 2024-06-05 NOTE — PROGRESS NOTES
6/5/2024  12:40 PM    Abdullahi Mtz is a 39 year old male.    Chief complaint(s):   Chief Complaint   Patient presents with    Urgent Care F/u     Was seen in  urgent care again for same cough and SOB had spirometry test. Continues with minor cough, hoarseness voice, minor wheezing      HPI:     Abdullahi Mtz primary complaint is regarding Wheezing.     Abdullahi Mtz complains of asthma symptoms.  Patient has asthma which was first diagnosed at age of 38.  The frequency of attacks averages several times per month.  Recently he is waking up at night 1 a week. Patient is currently at baseline, and is not having an exacerbation. Abdullahi Mtz current asthma medication(s) includes albuterol MDI, Albuterol Nebs, , oral Prednisone. Number of times using albuterol MDI > 4 /week. Current asthma symptoms include  chronic cough. he denies having SOB. Patient is compliance with his medications regimen.  He does not smoke.  Pertinent medical history is significant for Diabetes. There are no apparent asthma triggers. Patient has required 0 hospitalization(s) for asthma attacks in the past.  Recent spirometry showed FEV1/FVC 0.74. Recent chest x ray was negative.       HISTORY:  Past Medical History:    Dog bite of face    Essential hypertension    Hyperlipidemia    Renal stone    no surgical interventions were required, self expeled      Past Surgical History:   Procedure Laterality Date    Other surgical history  age 2    facial plastic surgery 2nd to dog bite      Family History   Problem Relation Age of Onset    Gastro-Intestinal Disorder Father         diverticulosis    Hypertension Mother     Stroke Mother         cerebrovascular accident (TIA's)    Dementia Maternal Grandfather     Other (Other) Maternal Grandmother         HD ?      Social History:   Social History     Socioeconomic History    Marital status:    Tobacco Use    Smoking status: Former     Current packs/day: 0.25     Types: Cigarettes     Smokeless tobacco: Never   Vaping Use    Vaping status: Never Used   Substance and Sexual Activity    Alcohol use: Yes     Alcohol/week: 0.0 standard drinks of alcohol    Drug use: No        Immunizations:   Immunization History   Administered Date(s) Administered    Covid-19 Vaccine Pfizer 30 mcg/0.3 ml 04/16/2021, 05/10/2021    Covid-19 Vaccine Pfizer Eduardo-Sucrose 30 mcg/0.3 ml 02/09/2022    FLULAVAL 6 months & older 0.5 ml Prefilled syringe (70464) 11/12/2021, 11/19/2022, 10/19/2023    TDAP 12/17/2015       Medications (Active prior to today's visit):  Current Outpatient Medications   Medication Sig Dispense Refill    albuterol 108 (90 Base) MCG/ACT Inhalation Aero Soln Inhale 2 puffs into the lungs every 6 (six) hours as needed. 1 each 1    fluticasone-salmeterol (ADVAIR HFA) 230-21 MCG/ACT Inhalation Aerosol Inhale 1 puff into the lungs 2 (two) times daily. 1 each 3    Respiratory Therapy Supplies (NEBULIZER/TUBING/MOUTHPIECE) Does not apply Kit Use as directed Q 2-4-6 hrs prn 1 each 0    albuterol (2.5 MG/3ML) 0.083% Inhalation Nebu Soln Take 1.5 mL (1.25 mg total) by nebulization every 4 (four) hours as needed for Wheezing. 25 each 3    Spacer/Aero-Holding Chambers Does not apply Device Use with albuterol inhaler 1 each 0    albuterol 108 (90 Base) MCG/ACT Inhalation Aero Soln Inhale 2 puffs into the lungs every 4 (four) hours as needed for Wheezing. 1 each 0    benzonatate 100 MG Oral Cap Take 1 capsule (100 mg total) by mouth 3 (three) times daily as needed for cough. 30 capsule 0    guaiFENesin-codeine (CHERATUSSIN AC) 100-10 MG/5ML Oral Solution Take 5 mL by mouth every 6 (six) hours as needed for cough. 50 mL 0    clotrimazole-betamethasone 1-0.05 % External Cream Apply 1 Application topically 2 (two) times daily as needed. 60 g 0    semaglutide (OZEMPIC, 0.25 OR 0.5 MG/DOSE,) 2 MG/3ML Subcutaneous Solution Pen-injector Administer 0.5 mg Q week 5 each 1    albuterol 108 (90 Base) MCG/ACT Inhalation Aero  Soln Inhale 2 puffs into the lungs every 4 (four) hours as needed for Wheezing. 1 each 0    omeprazole 20 MG Oral Capsule Delayed Release Take 1 capsule (20 mg total) by mouth daily. 30 capsule 0    Repaglinide 0.5 MG Oral Tab Take 1 tablet (0.5 mg total) by mouth 3 (three) times daily before meals. 270 tablet 1    Glucose Blood (CONTOUR NEXT TEST) In Vitro Strip Test blood sugar three times per day. 300 strip 3    amLODIPine-Olmesartan 10-40 MG Oral Tab Take 1 tablet by mouth daily. 90 tablet 3    atorvastatin 40 MG Oral Tab Take 1 tablet (40 mg total) by mouth at bedtime. 90 tablet 3    chlorthalidone 25 MG Oral Tab Take 1 tablet (25 mg total) by mouth daily. 90 tablet 3    MICROLET LANCETS Does not apply Misc TEST BLOOD SUGAR THREE TIMES DAILY 100 each 3       Allergies:  Allergies   Allergen Reactions    Seasonal OTHER (SEE COMMENTS)     Watery eyes, sneezing, itchy throat         ROS:   Review of Systems   Constitutional:  Negative for appetite change, fatigue and fever.   Respiratory:  Positive for cough and wheezing. Negative for shortness of breath.    Cardiovascular:  Negative for chest pain.   Gastrointestinal:  Negative for abdominal pain.   Musculoskeletal:  Negative for myalgias.   Skin:  Negative for rash.   Neurological:  Negative for dizziness, weakness and headaches.       PHYSICAL EXAM:   VS: /71 (BP Location: Right arm, Patient Position: Sitting, Cuff Size: adult)   Pulse 81   Ht 5' 11\" (1.803 m)   Wt 221 lb 3.2 oz (100.3 kg)   BMI 30.85 kg/m²     Physical Exam  Vitals reviewed.   Constitutional:       Appearance: Normal appearance. He is well-developed.   HENT:      Head: Normocephalic.   Eyes:      General: No scleral icterus.     Conjunctiva/sclera: Conjunctivae normal.   Cardiovascular:      Rate and Rhythm: Normal rate.   Pulmonary:      Effort: Pulmonary effort is normal.      Breath sounds: Wheezing and rhonchi present.   Musculoskeletal:      Cervical back: Neck supple.   Skin:      Findings: No rash.   Psychiatric:         Mood and Affect: Mood normal.         LABORATORY RESULTS:   No results found for: \"URCOLOR\", \"URCLA\", \"URINELEUK\", \"URINENITRITE\", \"URINEBLOOD\"   Results for orders placed or performed during the hospital encounter of 05/30/24   Rapid SARS-CoV-2 by PCR    Specimen: Nares; Other   Result Value Ref Range    Rapid SARS-CoV-2 by PCR Not Detected Not Detected       EKG / Spirometry : -     Radiology: XR CHEST PA + LAT CHEST (CPT=71046)    Result Date: 5/13/2024  PROCEDURE: XR CHEST PA + LAT CHEST (CPT=71046)  COMPARISON: None.  INDICATIONS: Congestion, wheezing and intermittent retrosternal chest pain x2 months. History of hypertension and diabetes.  TECHNIQUE:   Two views.   FINDINGS: CARDIAC/VASC: Normal.  No cardiac silhouette abnormality or cardiomegaly.  Unremarkable pulmonary vasculature.  MEDIAST/TACOS: No visible mass or adenopathy. LUNGS/PLEURA: Normal.  No significant pulmonary parenchymal abnormalities.  No effusion or pleural thickening.  BONES: No fracture or visible bony lesion. OTHER: Negative.          CONCLUSION: Normal examination.     Dictated by (CST): Amarjit Brock MD on 5/13/2024 at 6:46 PM     Finalized by (CST): Amarjit Brock MD on 5/13/2024 at 6:47 PM             ASSESSMENT/PLAN:   Assessment   Encounter Diagnosis   Name Primary?    Mild persistent reactive airway disease without complication (HCC) Yes         MEDICATIONS:    albuterol 108 (90 Base) MCG/ACT Inhalation Aero Soln, Inhale 2 puffs into the lungs every 6 (six) hours as needed., Disp: 1 each, Rfl: 1    fluticasone-salmeterol (ADVAIR HFA) 230-21 MCG/ACT Inhalation Aerosol, Inhale 1 puff into the lungs 2 (two) times daily., Disp: 1 each, Rfl: 3    Respiratory Therapy Supplies (NEBULIZER/TUBING/MOUTHPIECE) Does not apply Kit, Use as directed Q 2-4-6 hrs prn, Disp: 1 each, Rfl: 0    albuterol (2.5 MG/3ML) 0.083% Inhalation Nebu Soln, Take 1.5 mL (1.25 mg total) by nebulization every 4 (four)  hours as needed for Wheezing., Disp: 25 each, Rfl: 3    Spacer/Aero-Holding Chambers Does not apply Device, Use with albuterol inhaler, Disp: 1 each, Rfl: 0    albuterol 108 (90 Base) MCG/ACT Inhalation Aero Soln, Inhale 2 puffs into the lungs every 4 (four) hours as needed for Wheezing., Disp: 1 each, Rfl: 0    benzonatate 100 MG Oral Cap, Take 1 capsule (100 mg total) by mouth 3 (three) times daily as needed for cough., Disp: 30 capsule, Rfl: 0    guaiFENesin-codeine (CHERATUSSIN AC) 100-10 MG/5ML Oral Solution, Take 5 mL by mouth every 6 (six) hours as needed for cough., Disp: 50 mL, Rfl: 0    clotrimazole-betamethasone 1-0.05 % External Cream, Apply 1 Application topically 2 (two) times daily as needed., Disp: 60 g, Rfl: 0    semaglutide (OZEMPIC, 0.25 OR 0.5 MG/DOSE,) 2 MG/3ML Subcutaneous Solution Pen-injector, Administer 0.5 mg Q week, Disp: 5 each, Rfl: 1    albuterol 108 (90 Base) MCG/ACT Inhalation Aero Soln, Inhale 2 puffs into the lungs every 4 (four) hours as needed for Wheezing., Disp: 1 each, Rfl: 0    omeprazole 20 MG Oral Capsule Delayed Release, Take 1 capsule (20 mg total) by mouth daily., Disp: 30 capsule, Rfl: 0    Repaglinide 0.5 MG Oral Tab, Take 1 tablet (0.5 mg total) by mouth 3 (three) times daily before meals., Disp: 270 tablet, Rfl: 1    Glucose Blood (CONTOUR NEXT TEST) In Vitro Strip, Test blood sugar three times per day., Disp: 300 strip, Rfl: 3    amLODIPine-Olmesartan 10-40 MG Oral Tab, Take 1 tablet by mouth daily., Disp: 90 tablet, Rfl: 3    atorvastatin 40 MG Oral Tab, Take 1 tablet (40 mg total) by mouth at bedtime., Disp: 90 tablet, Rfl: 3    chlorthalidone 25 MG Oral Tab, Take 1 tablet (25 mg total) by mouth daily., Disp: 90 tablet, Rfl: 3    MICROLET LANCETS Does not apply Misc, TEST BLOOD SUGAR THREE TIMES DAILY, Disp: 100 each, Rfl: 3     Refills Given today:    Requested Prescriptions     Signed Prescriptions Disp Refills    albuterol 108 (90 Base) MCG/ACT Inhalation Aero  Soln 1 each 1     Sig: Inhale 2 puffs into the lungs every 6 (six) hours as needed.    fluticasone-salmeterol (ADVAIR HFA) 230-21 MCG/ACT Inhalation Aerosol 1 each 3     Sig: Inhale 1 puff into the lungs 2 (two) times daily.    Respiratory Therapy Supplies (NEBULIZER/TUBING/MOUTHPIECE) Does not apply Kit 1 each 0     Sig: Use as directed Q 2-4-6 hrs prn    albuterol (2.5 MG/3ML) 0.083% Inhalation Nebu Soln 25 each 3     Sig: Take 1.5 mL (1.25 mg total) by nebulization every 4 (four) hours as needed for Wheezing.   .   RECOMMENDATIONS given include: Call Dr. Sidhu or 911 / Go to the ER (local hospital) if you are (or your child is) experincing respiratory dystress: Increasing shortness of breath , difficult breathing, increase respiratory rate, chest tightning-pain or increasing wheezing., avoidance of heavy exertion, limitation of cold air exposure, and identification and avoidance of asthma triggers.    PATIENT EDUCATION provided today included an explanation of peak flow monitoring and devision of a plan of action for when her peak flows begin to fall  *ACTION PLAN: Written plan form given  GREEN ZONE: continue daily maintenance medications;  YELLOW  ZONE: Begin & increase albuterol MDI use to every 4-6 hrs hours; If symptoms persist call our office to schedule an appointment.  RED  ZONE: increase albuterol MDI use to every 2-4 hrs hours;  begin albuterol nebulizer treatments every 2-4 hrs hours;  call the office to make an appointment .    FOLLOW-UP:    Instructed to call if she develops new or worsening symptoms, including increasing chest pain and worsening shortness of breath.     Schedule a follow-up appointment in 3 weeks.            Orders This Visit:  Orders Placed This Encounter   Procedures    Allergen [facility]. Reg. Prof    Allergy Adult Foof Profile, Reflex       Meds This Visit:  Requested Prescriptions     Signed Prescriptions Disp Refills    albuterol 108 (90 Base) MCG/ACT Inhalation Aero Soln  1 each 1     Sig: Inhale 2 puffs into the lungs every 6 (six) hours as needed.    fluticasone-salmeterol (ADVAIR HFA) 230-21 MCG/ACT Inhalation Aerosol 1 each 3     Sig: Inhale 1 puff into the lungs 2 (two) times daily.    Respiratory Therapy Supplies (NEBULIZER/TUBING/MOUTHPIECE) Does not apply Kit 1 each 0     Sig: Use as directed Q 2-4-6 hrs prn    albuterol (2.5 MG/3ML) 0.083% Inhalation Nebu Soln 25 each 3     Sig: Take 1.5 mL (1.25 mg total) by nebulization every 4 (four) hours as needed for Wheezing.       Imaging & Referrals:  None         RANDOLPH RUDD MD

## 2024-06-08 LAB
A ALTERNATA IGE QN: <0.1 KUA/L (ref ?–0.1)
ALLERGEN BRAZIL NUT: <0.1 KUA/L (ref ?–0.1)
ALMOND IGE QN: <0.1 KUA/L (ref ?–0.1)
C HERBARUM IGE QN: <0.1 KUA/L (ref ?–0.1)
CASHEW NUT IGE QN: <0.1 KUA/L (ref ?–0.1)
CAT DANDER IGE QN: <0.1 KUA/L (ref ?–0.1)
CLAM IGE QN: <0.1 KUA/L (ref ?–0.1)
CODFISH IGE QN: <0.1 KUA/L (ref ?–0.1)
COMMON RAGWEED IGE QN: <0.1 KUA/L (ref ?–0.1)
CORN IGE QN: <0.1 KUA/L (ref ?–0.1)
COW MILK IGE QN: <0.1 KUA/L (ref ?–0.1)
D FARINAE IGE QN: <0.1 KUA/L (ref ?–0.1)
DOG DANDER IGE QN: <0.1 KUA/L (ref ?–0.1)
EGG WHITE IGE QN: <0.1 KUA/L (ref ?–0.1)
GOOSEFOOT IGE QN: <0.1 KUA/L (ref ?–0.1)
HAZELNUT IGE QN: <0.1 KUA/L (ref ?–0.1)
HOUSE DUST HS IGE QN: <0.1 KUA/L (ref ?–0.1)
IGE SERPL-ACNC: 100 KU/L (ref 2–214)
IGE SERPL-ACNC: 99.8 KU/L (ref 2–214)
KENT BLUE GRASS IGE QN: <0.1 KUA/L (ref ?–0.1)
PEANUT IGE QN: <0.1 KUA/L (ref ?–0.1)
PER RYE GRASS IGE QN: <0.1 KUA/L (ref ?–0.1)
SALMON IGE QN: <0.1 KUA/L (ref ?–0.1)
SCALLOP IGE QN: <0.1 KUA/L (ref ?–0.1)
SESAME SEED IGE QN: <0.1 KUA/L (ref ?–0.1)
SHRIMP IGE QN: <0.1 KUA/L (ref ?–0.1)
SOYBEAN IGE QN: <0.1 KUA/L (ref ?–0.1)
WALNUT IGE QN: <0.1 KUA/L (ref ?–0.1)
WHEAT IGE QN: <0.1 KUA/L (ref ?–0.1)
WHITE ELM IGE QN: <0.1 KUA/L (ref ?–0.1)
WHITE OAK IGE QN: <0.1 KUA/L (ref ?–0.1)

## 2024-06-21 RX ORDER — SEMAGLUTIDE 0.68 MG/ML
INJECTION, SOLUTION SUBCUTANEOUS
Qty: 5 EACH | Refills: 1 | Status: CANCELLED | OUTPATIENT
Start: 2024-06-21

## 2024-06-24 ENCOUNTER — OFFICE VISIT (OUTPATIENT)
Dept: FAMILY MEDICINE CLINIC | Facility: CLINIC | Age: 39
End: 2024-06-24

## 2024-06-24 VITALS
DIASTOLIC BLOOD PRESSURE: 69 MMHG | WEIGHT: 223.38 LBS | BODY MASS INDEX: 31.27 KG/M2 | SYSTOLIC BLOOD PRESSURE: 119 MMHG | HEART RATE: 97 BPM | HEIGHT: 71 IN

## 2024-06-24 DIAGNOSIS — E11.9 TYPE 2 DIABETES MELLITUS WITHOUT COMPLICATION, WITHOUT LONG-TERM CURRENT USE OF INSULIN (HCC): Primary | ICD-10-CM

## 2024-06-24 DIAGNOSIS — J45.30 MILD PERSISTENT REACTIVE AIRWAY DISEASE WITHOUT COMPLICATION (HCC): ICD-10-CM

## 2024-06-24 PROCEDURE — 3078F DIAST BP <80 MM HG: CPT | Performed by: FAMILY MEDICINE

## 2024-06-24 PROCEDURE — 3008F BODY MASS INDEX DOCD: CPT | Performed by: FAMILY MEDICINE

## 2024-06-24 PROCEDURE — 99213 OFFICE O/P EST LOW 20 MIN: CPT | Performed by: FAMILY MEDICINE

## 2024-06-24 PROCEDURE — 3074F SYST BP LT 130 MM HG: CPT | Performed by: FAMILY MEDICINE

## 2024-06-24 RX ORDER — SEMAGLUTIDE 0.68 MG/ML
INJECTION, SOLUTION SUBCUTANEOUS
Qty: 5 EACH | Refills: 2 | Status: SHIPPED | OUTPATIENT
Start: 2024-06-24

## 2024-06-24 RX ORDER — LANCETS
1 EACH MISCELLANEOUS 2 TIMES DAILY
Qty: 100 EACH | Refills: 3 | Status: SHIPPED | OUTPATIENT
Start: 2024-06-24

## 2024-06-24 RX ORDER — PEAK FLOW METER
EACH MISCELLANEOUS
COMMUNITY
Start: 2024-06-05

## 2024-06-24 NOTE — PROGRESS NOTES
6/24/2024  4:49 PM    Abdullahi Mtz is a 39 year old male.    Chief complaint(s):   Chief Complaint   Patient presents with    Follow - Up     3 week - patient states is feeling better     Medication Request     Strips - patient needs refill - pt check sugar 4 times a day      HPI:     Abdullahi Mtz primary complaint is regarding diabetes.     Patient Abdullahi Mtz is a 38 year old male is here to be evaluated for type 2 diabetes.  Specifically, male has type 2, insulin none requiring diabetes. Compliance with treatment has been fair.  Patient's diabetes was first diagnosed 2017.  Patient follows a 2000 calorie ADA diet.  Patient report experiencing the following diabetes related symptoms; Negative for polyuria, Negative for polydipsia, Positive for blurred vision.  Depression symptoms include none.  Tobacco screen: none  smoker.  Current meds include :  oral hypoglycemic include:  Prandin 0.5 mg TID  insulin/injectable : Ozempic  0.5 mg Q week .  Hypoglycemia severity is not applicable.he reports home blood glucose readings have been [phone] (#s) and believes  having fair glucose control.  Most recent lab results include glycohemoglobin 5.8 %, microalbuminuria have been negative.  In regard to preventative care, his last ophthalmology exam was in > 12 months ago.  Opthalmic evaluation have shown none pathology.  Concurrent relative health problems include HTN, elevated LFTs and hyperlipidemia.      In addition patient is following up regarding asthma.  Recent allergy test done were essentially normal.  He is feeling much better and has not been able to need and use albuterol since starting Advair.  Denies any cough, wheezing, shortness of breath.  Reports breathing normally again.      HISTORY:  Past Medical History:    Dog bite of face    Essential hypertension    Hyperlipidemia    Renal stone    no surgical interventions were required, self expeled      Past Surgical History:   Procedure Laterality Date     Other surgical history  age 2    facial plastic surgery 2nd to dog bite      Family History   Problem Relation Age of Onset    Gastro-Intestinal Disorder Father         diverticulosis    Hypertension Mother     Stroke Mother         cerebrovascular accident (TIA's)    Dementia Maternal Grandfather     Other (Other) Maternal Grandmother         HD ?      Social History:   Social History     Socioeconomic History    Marital status:    Tobacco Use    Smoking status: Former     Current packs/day: 0.25     Types: Cigarettes    Smokeless tobacco: Never   Vaping Use    Vaping status: Never Used   Substance and Sexual Activity    Alcohol use: Yes     Alcohol/week: 0.0 standard drinks of alcohol    Drug use: No        Immunizations:   Immunization History   Administered Date(s) Administered    Covid-19 Vaccine Pfizer 30 mcg/0.3 ml 04/16/2021, 05/10/2021    Covid-19 Vaccine Pfizer Eduardo-Sucrose 30 mcg/0.3 ml 02/09/2022    FLULAVAL 6 months & older 0.5 ml Prefilled syringe ([id number]) 11/12/2021, 11/19/2022, 10/19/2023    TDAP 12/17/2015       Medications (Active prior to today's visit):  Current Outpatient Medications   Medication Sig Dispense Refill    Nebulizers ([employer] AEROSOL DELIVERY SYSTEM) Does not apply Misc USE AS DIRECTED EVERY 2-4-6 HRS AS NEEDED      semaglutide (OZEMPIC, 0.25 OR 0.5 MG/DOSE,) 2 MG/3ML Subcutaneous Solution Pen-injector Administer 0.5 mg Q week 5 each 2    Microlet Lancets Does not apply Misc 1 strip by In Vitro route in the morning and 1 strip before bedtime. 100 each 3    Glucose Blood (CONTOUR NEXT TEST) In Vitro Strip Test blood sugar 2 times per day. 300 strip 3    albuterol 108 (90 Base) MCG/ACT Inhalation Aero Soln Inhale 2 puffs into the lungs every 6 (six) hours as needed. 1 each 1    fluticasone-salmeterol (ADVAIR HFA) 230-21 MCG/ACT Inhalation Aerosol Inhale 1 puff into the lungs 2 (two) times daily. 1 each 3    Respiratory Therapy Supplies (NEBULIZER/TUBING/MOUTHPIECE) Does not  apply Kit Use as directed Q 2-4-6 hrs prn 1 each 0    Spacer/Aero-Holding Chambers Does not apply Device Use with albuterol inhaler 1 each 0    albuterol 108 (90 Base) MCG/ACT Inhalation Aero Soln Inhale 2 puffs into the lungs every 4 (four) hours as needed for Wheezing. 1 each 0    clotrimazole-betamethasone 1-0.05 % External Cream Apply 1 Application topically 2 (two) times daily as needed. 60 g 0    Repaglinide 0.5 MG Oral Tab Take 1 tablet (0.5 mg total) by mouth 3 (three) times daily before meals. 270 tablet 1    amLODIPine-Olmesartan 10-40 MG Oral Tab Take 1 tablet by mouth daily. 90 tablet 3    atorvastatin 40 MG Oral Tab Take 1 tablet (40 mg total) by mouth at bedtime. 90 tablet 3    chlorthalidone 25 MG Oral Tab Take 1 tablet (25 mg total) by mouth daily. 90 tablet 3       Allergies:  Allergies   Allergen Reactions    Seasonal OTHER (SEE COMMENTS)     Watery eyes, sneezing, itchy throat         ROS:   Review of Systems   Constitutional:  Negative for appetite change, fatigue and fever.   Respiratory:  Negative for cough, shortness of breath and wheezing.    Cardiovascular:  Negative for chest pain.   Gastrointestinal:  Negative for abdominal pain.   Endocrine: Negative for polydipsia and polyuria.   Musculoskeletal:  Negative for myalgias.   Skin:  Negative for rash.   Neurological:  Negative for dizziness, weakness and headaches.       PHYSICAL EXAM:   VS: /69   Pulse 97   Ht 5' 11\" (1.803 m)   Wt 223 lb 6.4 oz (101.3 kg)   BMI 31.16 kg/m²     Physical Exam  Vitals reviewed.   Constitutional:       Appearance: Normal appearance. He is well-developed.   HENT:      Head: Normocephalic.   Eyes:      General: No scleral icterus.     Conjunctiva/sclera: Conjunctivae normal.   Cardiovascular:      Rate and Rhythm: Normal rate and regular rhythm.      Heart sounds: Normal heart sounds.   Pulmonary:      Effort: Pulmonary effort is normal.      Breath sounds: Normal breath sounds.   Musculoskeletal:       Cervical back: Neck supple.   Skin:     Findings: No rash.   Psychiatric:         Mood and Affect: Mood normal.         LABORATORY RESULTS:   No results found for: \"URCOLOR\", \"URCLA\", \"URINELEUK\", \"URINENITRITE\", \"URINEBLOOD\"   Results for orders placed or performed in visit on 06/05/24   Allergen [place] Reg. Prof   Result Value Ref Range    Allergen A. Alternata <0.10 <0.10 kUA/L    Allergen C. Herbarum <0.10 <0.10 kUA/L    Allergen Cat Dander <0.10 <0.10 kUA/L    Allergen Common Ragweed <0.10 <0.10 kUA/L    Allergen D. Farinae <0.10 <0.10 kUA/L    Allergen Dog Dander <0.10 <0.10 kUA/L    Allergen Elm Tree <0.10 <0.10 kUA/L    Allergen House Dust H/S <0.10 <0.10 kUA/L    Allergen [place] Blue Grass <0.10 <0.10 kUA/L    Allergen Lamb[place] Quarter <0.10 <0.10 kUA/L    Allergen [place] Tree <0.10 <0.10 kUA/L    Allergen Rye Grass <0.10 <0.10 kUA/L    Immunoglobulin E 99.80 2.00 - 214.00 kU/L   Allergy Adult Foof Profile, Reflex   Result Value Ref Range    Allergen Cook <0.10 <0.10 kUA/L    Allergen Brazil Nut <0.10 <0.10 kUA/L    Allergen Cashew <0.10 <0.10 kUA/L    Allergen Clam <0.10 <0.10 kUA/L    Allergen Corn <0.10 <0.10 kUA/L    Allergen Egg White <0.10 <0.10 kUA/L    Allergen Fish <0.10 <0.10 kUA/L    Allergen Hazelnut <0.10 <0.10 kUA/L    Allergen Milk <0.10 <0.10 kUA/L    Allergen Peanut <0.10 <0.10 kUA/L    Allergen [place] <0.10 <0.10 kUA/L    Allergen Scallop <0.10 <0.10 kUA/L    Allergen Sesame Seed <0.10 <0.10 kUA/L    Allergen Shrimp <0.10 <0.10 kUA/L    Allergen Soybean <0.10 <0.10 kUA/L    Allergen [place] <0.10 <0.10 kUA/L    Allergen Wheat <0.10 <0.10 kUA/L    Immunoglobulin E 100.00 2.00 - 214.00 kU/L     EKG / Spirometry : -     Radiology: No results found.     ASSESSMENT/PLAN:   Assessment   Encounter Diagnoses   Name Primary?    Type 2 diabetes mellitus without complication, without long-term current use of insulin (HCC) Yes    Mild persistent reactive airway disease without complication (HCC)         1. Type 2 diabetes mellitus without complication, without long-term current use of insulin (HCC)    DIABETES A&P    LABORATORY & ORDERS: Blood test(s) ordered today ; No orders of the defined types were placed in this encounter.    Additional orders include:   MEDICATIONS:    Nebulizers ([facility]OS AEROSOL DELIVERY SYSTEM) Does not apply Misc, USE AS DIRECTED EVERY 2-4-6 HRS AS NEEDED, Disp: , Rfl:     semaglutide (OZEMPIC, 0.25 OR 0.5 MG/DOSE,) 2 MG/3ML Subcutaneous Solution Pen-injector, Administer 0.5 mg Q week, Disp: 5 each, Rfl: 2    Microlet Lancets Does not apply Misc, 1 strip by In Vitro route in the morning and 1 strip before bedtime., Disp: 100 each, Rfl: 3    Glucose Blood (CONTOUR NEXT TEST) In Vitro Strip, Test blood sugar 2 times per day., Disp: 300 strip, Rfl: 3    albuterol 108 (90 Base) MCG/ACT Inhalation Aero Soln, Inhale 2 puffs into the lungs every 6 (six) hours as needed., Disp: 1 each, Rfl: 1    fluticasone-salmeterol (ADVAIR HFA) 230-21 MCG/ACT Inhalation Aerosol, Inhale 1 puff into the lungs 2 (two) times daily., Disp: 1 each, Rfl: 3    Respiratory Therapy Supplies (NEBULIZER/TUBING/MOUTHPIECE) Does not apply Kit, Use as directed Q 2-4-6 hrs prn, Disp: 1 each, Rfl: 0    Spacer/Aero-Holding Chambers Does not apply Device, Use with albuterol inhaler, Disp: 1 each, Rfl: 0    albuterol 108 (90 Base) MCG/ACT Inhalation Aero Soln, Inhale 2 puffs into the lungs every 4 (four) hours as needed for Wheezing., Disp: 1 each, Rfl: 0    clotrimazole-betamethasone 1-0.05 % External Cream, Apply 1 Application topically 2 (two) times daily as needed., Disp: 60 g, Rfl: 0    Repaglinide 0.5 MG Oral Tab, Take 1 tablet (0.5 mg total) by mouth 3 (three) times daily before meals., Disp: 270 tablet, Rfl: 1    amLODIPine-Olmesartan 10-40 MG Oral Tab, Take 1 tablet by mouth daily., Disp: 90 tablet, Rfl: 3    atorvastatin 40 MG Oral Tab, Take 1 tablet (40 mg total) by mouth at bedtime., Disp: 90 tablet, Rfl: 3     chlorthalidone 25 MG Oral Tab, Take 1 tablet (25 mg total) by mouth daily., Disp: 90 tablet, Rfl: 3.  Requested Prescriptions     Signed Prescriptions Disp Refills    semaglutide (OZEMPIC, 0.25 OR 0.5 MG/DOSE,) 2 MG/3ML Subcutaneous Solution Pen-injector 5 each 2     Sig: Administer 0.5 mg Q week    Microlet Lancets Does not apply Misc 100 each 3     Si strip by In Vitro route in the morning and 1 strip before bedtime.    Glucose Blood (CONTOUR NEXT TEST) In Vitro Strip 300 strip 3     Sig: Test blood sugar 2 times per day.      RECOMMENDATIONS: instructed in use of glucometer ( check fasting glucose BID), return for training in administering insulin injections, adherence to an 1800 calorie ADA diet,  a graduated exercise program, HgbA1C level checked quarterly, daily foot self-inspection, need for yearly flu shots, and avoid all sodas, juices, candy, chocolates, cakes, ice cream, etc.      FOLLOW-UP: Schedule a follow-up visit in 6 months.     RTC in 4 months for CPE.     COUNSELING: The patient was counseled concerning the relationship between diabetes control and macrovascular disease including cardiovascular, cerebrovascular and peripheral vascular disease. The patient was counseled concerning the relationship between diabetes control and retinopathy, nephropathy, and neuropathy. Advised as to the targets of pre-meal glucoses ( mg/dl) and post meal glucoses (<140-160 mg/dl) Home glucose testing discussed. The A1c target of <7% according to ADA and <6.5% according to AACE were discussed.           2. Mild persistent reactive airway disease without complication (HCC)        MEDICATIONS:    Nebulizers (VIOS AEROSOL DELIVERY SYSTEM) Does not apply Misc, USE AS DIRECTED EVERY 2-4-6 HRS AS NEEDED, Disp: , Rfl:     semaglutide (OZEMPIC, 0.25 OR 0.5 MG/DOSE,) 2 MG/3ML Subcutaneous Solution Pen-injector, Administer 0.5 mg Q week, Disp: 5 each, Rfl: 2    Microlet Lancets Does not apply Misc, 1 strip by In Vitro  route in the morning and 1 strip before bedtime., Disp: 100 each, Rfl: 3    Glucose Blood (CONTOUR NEXT TEST) In Vitro Strip, Test blood sugar 2 times per day., Disp: 300 strip, Rfl: 3    albuterol 108 (90 Base) MCG/ACT Inhalation Aero Soln, Inhale 2 puffs into the lungs every 6 (six) hours as needed., Disp: 1 each, Rfl: 1    fluticasone-salmeterol (ADVAIR HFA) 230-21 MCG/ACT Inhalation Aerosol, Inhale 1 puff into the lungs 2 (two) times daily., Disp: 1 each, Rfl: 3    Respiratory Therapy Supplies (NEBULIZER/TUBING/MOUTHPIECE) Does not apply Kit, Use as directed Q 2-4-6 hrs prn, Disp: 1 each, Rfl: 0    Spacer/Aero-Holding Chambers Does not apply Device, Use with albuterol inhaler, Disp: 1 each, Rfl: 0    albuterol 108 (90 Base) MCG/ACT Inhalation Aero Soln, Inhale 2 puffs into the lungs every 4 (four) hours as needed for Wheezing., Disp: 1 each, Rfl: 0    clotrimazole-betamethasone 1-0.05 % External Cream, Apply 1 Application topically 2 (two) times daily as needed., Disp: 60 g, Rfl: 0    Repaglinide 0.5 MG Oral Tab, Take 1 tablet (0.5 mg total) by mouth 3 (three) times daily before meals., Disp: 270 tablet, Rfl: 1    amLODIPine-Olmesartan 10-40 MG Oral Tab, Take 1 tablet by mouth daily., Disp: 90 tablet, Rfl: 3    atorvastatin 40 MG Oral Tab, Take 1 tablet (40 mg total) by mouth at bedtime., Disp: 90 tablet, Rfl: 3    chlorthalidone 25 MG Oral Tab, Take 1 tablet (25 mg total) by mouth daily., Disp: 90 tablet, Rfl: 3     Refills Given today:    Requested Prescriptions     Signed Prescriptions Disp Refills    semaglutide (OZEMPIC, 0.25 OR 0.5 MG/DOSE,) 2 MG/3ML Subcutaneous Solution Pen-injector 5 each 2     Sig: Administer 0.5 mg Q week    Microlet Lancets Does not apply Misc 100 each 3     Si strip by In Vitro route in the morning and 1 strip before bedtime.    Glucose Blood (CONTOUR NEXT TEST) In Vitro Strip 300 strip 3     Sig: Test blood sugar 2 times per day.   .   RECOMMENDATIONS given include: Call   [place] or 911 / Go to the ER (local [facility]) if you are (or your child is) experincing respiratory dystress: Increasing shortness of breath , difficult breathing, increase respiratory rate, chest tightning-pain or increasing wheezing., avoidance of heavy exertion, limitation of cold air exposure, and identification and avoidance of asthma triggers.    PATIENT EDUCATION provided today included an explanation of peak flow monitoring and devision of a plan of action for when her peak flows begin to fall  *ACTION PLAN: Written plan form given  GREEN ZONE: continue daily maintenance medications;  YELLOW  ZONE: Begin & increase albuterol MDI use to every 4-6 hrs hours; If symptoms persist call our office to schedule an appointment.  RED  ZONE: increase albuterol MDI use to every 2-4 hrs hours;  begin albuterol nebulizer treatments every 2-4 hrs hours;  call the office to make an appointment .    FOLLOW-UP:   Instructed to call if she develops new or worsening symptoms, including increasing chest pain and worsening shortness of breath.     Schedule a follow-up appointment in 6 months.             Orders This Visit:  No orders of the defined types were placed in this encounter.      Meds This Visit:  Requested Prescriptions     Signed Prescriptions Disp Refills    semaglutide (OZEMPIC, 0.25 OR 0.5 MG/DOSE,) 2 MG/3ML Subcutaneous Solution Pen-injector 5 each 2     Sig: Administer 0.5 mg Q week    Microlet Lancets Does not apply Misc 100 each 3     Si strip by In Vitro route in the morning and 1 strip before bedtime.    Glucose Blood (CONTOUR NEXT TEST) In Vitro Strip 300 strip 3     Sig: Test blood sugar 2 times per day.       Imaging & Referrals:  None         RANDOLPH RUDD MD

## 2024-09-19 RX ORDER — FLUTICASONE PROPIONATE AND SALMETEROL XINAFOATE 230; 21 UG/1; UG/1
1 AEROSOL, METERED RESPIRATORY (INHALATION) 2 TIMES DAILY
Qty: 1 EACH | Refills: 3 | Status: SHIPPED | OUTPATIENT
Start: 2024-09-19

## 2024-09-19 NOTE — TELEPHONE ENCOUNTER
Please review; protocol failed/ has no protocol    Requested Prescriptions   Pending Prescriptions Disp Refills    fluticasone-salmeterol (ADVAIR HFA) 230-21 MCG/ACT Inhalation Aerosol 1 each 3     Sig: Inhale 1 puff into the lungs 2 (two) times daily.       Asthma & COPD Medication Protocol Failed - 9/15/2024  8:51 PM        Failed - Asthma Action Score greater than or equal to 20        Failed - AAP/ACT given in last 12 months     No data recorded  No data recorded  No data recorded  No data recorded          Passed - Appointment in past 6 or next 3 months      Recent Outpatient Visits              2 months ago Type 2 diabetes mellitus without complication, without long-term current use of insulin [facility]    3 months ago Mild persistent reactive airway disease without complication ([facility]    [facility] Visit    4 months ago Type 2 diabetes mellitus without complication, without long-term current use of insulin ([facility]    [facility]    7 months ago Uncontrolled type 2 diabetes mellitus with hyperglycemia ([facility]    [facility] Visit    10 months ago Type 2 diabetes mellitus without complication, without long-term current use of insulin ([facility])    [facility] [address]    Nurse Only          Future Appointments         Provider Department Appt Notes    In 1 month Arsenio Sidhu MD [facility] physical v    In 1 month Arsenio Sidhu MD [facility] 6 mnth                       Recent Outpatient Visits              2 months ago Type 2 diabetes mellitus without complication, without long-term current  use of insulin ([facility]    [facility]    3 months ago Mild persistent reactive airway disease without complication ([facility]    [facility] Visit    4 months ago Type 2 diabetes mellitus without complication, without long-term current use of insulin ([facility]    [facility] Visit    7 months ago Uncontrolled type 2 diabetes mellitus with hyperglycemia ([facility])    [facility], Arsenio Chiu MD    Office Visit    10 months ago Type 2 diabetes mellitus without complication, without long-term current use of insulin ([facility])    [facility]    Nurse Only          Future Appointments         Provider Department Appt Notes    In 1 month Arsenio Sidhu MD [facility] physical v    In 1 month Arsenio Sidhu MD [place] [id number] mnth

## 2024-10-07 ENCOUNTER — TELEPHONE (OUTPATIENT)
Dept: FAMILY MEDICINE CLINIC | Facility: CLINIC | Age: 39
End: 2024-10-07

## 2024-10-10 RX ORDER — SEMAGLUTIDE 0.68 MG/ML
INJECTION, SOLUTION SUBCUTANEOUS
Qty: 9 ML | Refills: 0 | Status: SHIPPED | OUTPATIENT
Start: 2024-10-10

## 2024-10-10 NOTE — TELEPHONE ENCOUNTER
Refill passed per [facility] protocol.  Requested Prescriptions   Pending Prescriptions Disp Refills    semaglutide (OZEMPIC, 0.25 OR 0.5 MG/DOSE,) 2 MG/3ML Subcutaneous Solution Pen-injector 5 each 2     Sig: Administer 0.5 mg Q week       Diabetes Medication Protocol Passed - 10/10/2024  2:16 PM        Passed - Last A1C < 7.5 and within past 6 months     Lab Results   Component Value Date    A1C 5.8 (A) 05/18/2024             Passed - In person appointment or virtual visit in the past 6 mos or appointment in next 3 mos     Recent Outpatient Visits              3 months ago Type 2 diabetes mellitus without complication, without long-term current use of insulin ([facility]    [facility]    4 months ago Mild persistent reactive airway disease without complication ([facility]    [facility]    4 months ago Type 2 diabetes mellitus without complication, without long-term current use of insulin ([facility]    [facility]    8 months ago Uncontrolled type 2 diabetes mellitus with hyperglycemia ([facility]    [facility]    11 months ago Type 2 diabetes mellitus without complication, without long-term current use of insulin ([facility])    [facility]    Nurse Only          Future Appointments         Provider Department Appt Notes    In 3 weeks [facility]    In 1 month Arsenio Sidhu MD [facility] 6 mnth                    Passed - Microalbumin procedure in past 12 months or taking ACE/ARB        Passed - EGFRCR or GFRNAA > 50     GFR Evaluation  EGFRCR: 68 , resulted on 10/25/2023           Passed - GFR in the past 12 months           Recent Outpatient Visits              3 months ago Type 2 diabetes mellitus without complication, without long-term current use of insulin ([facility])    [facility]    Office Visit    4 months ago Mild persistent reactive airway disease without complication ([facility]    [facility] Visit    4 months ago Type 2 diabetes mellitus without complication, without long-term current use of insulin ([facility])    [facility] [address], Arsenio Chiu MD    Office Visit    8 months ago Uncontrolled type 2 diabetes mellitus with hyperglycemia ([facility]    [facility] Visit    11 months ago Type 2 diabetes mellitus without complication, without long-term current use of insulin ([facility])    [facility]    [facility]          Future Appointments         Provider Department Appt Notes    In 3 weeks [facility]    In 1 month [facility]

## 2024-11-02 ENCOUNTER — OFFICE VISIT (OUTPATIENT)
Dept: FAMILY MEDICINE CLINIC | Facility: CLINIC | Age: 39
End: 2024-11-02
Payer: COMMERCIAL

## 2024-11-02 ENCOUNTER — LAB ENCOUNTER (OUTPATIENT)
Dept: LAB | Age: 39
End: 2024-11-02
Attending: FAMILY MEDICINE
Payer: COMMERCIAL

## 2024-11-02 VITALS
WEIGHT: 224.81 LBS | DIASTOLIC BLOOD PRESSURE: 71 MMHG | BODY MASS INDEX: 31.47 KG/M2 | SYSTOLIC BLOOD PRESSURE: 125 MMHG | HEART RATE: 81 BPM | HEIGHT: 71 IN

## 2024-11-02 DIAGNOSIS — Z00.00 PHYSICAL EXAM: Primary | ICD-10-CM

## 2024-11-02 DIAGNOSIS — Z00.00 PHYSICAL EXAM: ICD-10-CM

## 2024-11-02 LAB
ALBUMIN SERPL-MCNC: 4.4 G/DL (ref 3.2–4.8)
ALBUMIN/GLOB SERPL: 1.2 {RATIO} (ref 1–2)
ALP LIVER SERPL-CCNC: 121 U/L
ALT SERPL-CCNC: 29 U/L
ANION GAP SERPL CALC-SCNC: 8 MMOL/L (ref 0–18)
AST SERPL-CCNC: 27 U/L (ref ?–34)
BASOPHILS # BLD AUTO: 0.03 X10(3) UL (ref 0–0.2)
BASOPHILS NFR BLD AUTO: 0.4 %
BILIRUB SERPL-MCNC: 0.6 MG/DL (ref 0.3–1.2)
BILIRUB UR QL: NEGATIVE
BUN BLD-MCNC: 20 MG/DL (ref 9–23)
BUN/CREAT SERPL: 15.2 (ref 10–20)
CALCIUM BLD-MCNC: 10 MG/DL (ref 8.7–10.4)
CHLORIDE SERPL-SCNC: 102 MMOL/L (ref 98–112)
CHOLEST SERPL-MCNC: 124 MG/DL (ref ?–200)
CLARITY UR: CLEAR
CO2 SERPL-SCNC: 26 MMOL/L (ref 21–32)
CREAT BLD-MCNC: 1.32 MG/DL
CREAT UR-SCNC: 51.5 MG/DL
DEPRECATED RDW RBC AUTO: 38.6 FL (ref 35.1–46.3)
EGFRCR SERPLBLD CKD-EPI 2021: 70 ML/MIN/1.73M2 (ref 60–?)
EOSINOPHIL # BLD AUTO: 0.21 X10(3) UL (ref 0–0.7)
EOSINOPHIL NFR BLD AUTO: 3.1 %
ERYTHROCYTE [DISTWIDTH] IN BLOOD BY AUTOMATED COUNT: 13.1 % (ref 11–15)
EST. AVERAGE GLUCOSE BLD GHB EST-MCNC: 146 MG/DL (ref 68–126)
FASTING PATIENT LIPID ANSWER: YES
FASTING STATUS PATIENT QL REPORTED: YES
GLOBULIN PLAS-MCNC: 3.8 G/DL (ref 2–3.5)
GLUCOSE BLD-MCNC: 114 MG/DL (ref 70–99)
GLUCOSE UR-MCNC: NORMAL MG/DL
HBA1C MFR BLD: 6.7 % (ref ?–5.7)
HCT VFR BLD AUTO: 38.6 %
HDLC SERPL-MCNC: 33 MG/DL (ref 40–59)
HGB BLD-MCNC: 12.9 G/DL
HGB UR QL STRIP.AUTO: NEGATIVE
IMM GRANULOCYTES # BLD AUTO: 0.02 X10(3) UL (ref 0–1)
IMM GRANULOCYTES NFR BLD: 0.3 %
KETONES UR-MCNC: NEGATIVE MG/DL
LDLC SERPL CALC-MCNC: 73 MG/DL (ref ?–100)
LEUKOCYTE ESTERASE UR QL STRIP.AUTO: NEGATIVE
LYMPHOCYTES # BLD AUTO: 2.3 X10(3) UL (ref 1–4)
LYMPHOCYTES NFR BLD AUTO: 34.4 %
MCH RBC QN AUTO: 27.4 PG (ref 26–34)
MCHC RBC AUTO-ENTMCNC: 33.4 G/DL (ref 31–37)
MCV RBC AUTO: 82.1 FL
MICROALBUMIN UR-MCNC: <0.3 MG/DL
MONOCYTES # BLD AUTO: 0.63 X10(3) UL (ref 0.1–1)
MONOCYTES NFR BLD AUTO: 9.4 %
NEUTROPHILS # BLD AUTO: 3.49 X10 (3) UL (ref 1.5–7.7)
NEUTROPHILS # BLD AUTO: 3.49 X10(3) UL (ref 1.5–7.7)
NEUTROPHILS NFR BLD AUTO: 52.4 %
NITRITE UR QL STRIP.AUTO: NEGATIVE
NONHDLC SERPL-MCNC: 91 MG/DL (ref ?–130)
OSMOLALITY SERPL CALC.SUM OF ELEC: 285 MOSM/KG (ref 275–295)
PH UR: 5.5 [PH] (ref 5–8)
PLATELET # BLD AUTO: 117 10(3)UL (ref 150–450)
POTASSIUM SERPL-SCNC: 4.3 MMOL/L (ref 3.5–5.1)
PROT SERPL-MCNC: 8.2 G/DL (ref 5.7–8.2)
PROT UR-MCNC: NEGATIVE MG/DL
RBC # BLD AUTO: 4.7 X10(6)UL
SODIUM SERPL-SCNC: 136 MMOL/L (ref 136–145)
SP GR UR STRIP: 1.01 (ref 1–1.03)
TRIGL SERPL-MCNC: 95 MG/DL (ref 30–149)
TSI SER-ACNC: 1.03 UIU/ML (ref 0.55–4.78)
UROBILINOGEN UR STRIP-ACNC: NORMAL
VIT D+METAB SERPL-MCNC: 20.4 NG/ML (ref 30–100)
VLDLC SERPL CALC-MCNC: 15 MG/DL (ref 0–30)
WBC # BLD AUTO: 6.7 X10(3) UL (ref 4–11)

## 2024-11-02 PROCEDURE — 90471 IMMUNIZATION ADMIN: CPT | Performed by: FAMILY MEDICINE

## 2024-11-02 PROCEDURE — 81003 URINALYSIS AUTO W/O SCOPE: CPT

## 2024-11-02 PROCEDURE — 3078F DIAST BP <80 MM HG: CPT | Performed by: FAMILY MEDICINE

## 2024-11-02 PROCEDURE — 80053 COMPREHEN METABOLIC PANEL: CPT

## 2024-11-02 PROCEDURE — 82043 UR ALBUMIN QUANTITATIVE: CPT

## 2024-11-02 PROCEDURE — 3074F SYST BP LT 130 MM HG: CPT | Performed by: FAMILY MEDICINE

## 2024-11-02 PROCEDURE — 90715 TDAP VACCINE 7 YRS/> IM: CPT | Performed by: FAMILY MEDICINE

## 2024-11-02 PROCEDURE — 85025 COMPLETE CBC W/AUTO DIFF WBC: CPT

## 2024-11-02 PROCEDURE — 83036 HEMOGLOBIN GLYCOSYLATED A1C: CPT

## 2024-11-02 PROCEDURE — 82570 ASSAY OF URINE CREATININE: CPT

## 2024-11-02 PROCEDURE — 84443 ASSAY THYROID STIM HORMONE: CPT

## 2024-11-02 PROCEDURE — 36415 COLL VENOUS BLD VENIPUNCTURE: CPT

## 2024-11-02 PROCEDURE — 3008F BODY MASS INDEX DOCD: CPT | Performed by: FAMILY MEDICINE

## 2024-11-02 PROCEDURE — 82306 VITAMIN D 25 HYDROXY: CPT

## 2024-11-02 PROCEDURE — 80061 LIPID PANEL: CPT

## 2024-11-02 PROCEDURE — 99395 PREV VISIT EST AGE 18-39: CPT | Performed by: FAMILY MEDICINE

## 2024-11-02 RX ORDER — SEMAGLUTIDE 0.68 MG/ML
INJECTION, SOLUTION SUBCUTANEOUS
Qty: 9 ML | Refills: 0 | Status: SHIPPED | OUTPATIENT
Start: 2024-11-02

## 2024-11-02 NOTE — PROGRESS NOTES
11/2/2024  8:24 AM    Abdullahi Mtz is a 39 year old male.    Chief complaint(s):   Chief Complaint   Patient presents with    Physical     HPI:     Abdullahi Mtz primary complaint is regarding CPE.       Abdullahi Mtz is a 39 year old male is here for routine periodic health screening and examination.  His last physical exam was 8 years ago.  His last ECG was 5 years ago and was normal. His last diabetes screening test was 1 years ago and was abnormal: + Diabetes.   His last cholesterol test was 1 year ago and was hyperlipidemia .  Last dentist visit was 12months ago.  He is not current with his Td immunization.  He is not current with his Flu vaccination and is not interested in receiving it today.  Patient last colonoscopy was none. He is not a smoker.        HISTORY:  Past Medical History:    Dog bite of face    Essential hypertension    Hyperlipidemia    Renal stone    no surgical interventions were required, self expeled      Past Surgical History:   Procedure Laterality Date    Other surgical history  age 2    facial plastic surgery 2nd to dog bite      Family History   Problem Relation Age of Onset    Gastro-Intestinal Disorder Father         diverticulosis    Hypertension Mother     Stroke Mother         cerebrovascular accident (TIA's)    Dementia Maternal Grandfather     Other (Other) Maternal Grandmother         HD ?      Social History:   Social History     Socioeconomic History    Marital status:    Tobacco Use    Smoking status: Former     Current packs/day: 0.25     Types: Cigarettes    Smokeless tobacco: Never   Vaping Use    Vaping status: Never Used   Substance and Sexual Activity    Alcohol use: Yes     Alcohol/week: 0.0 standard drinks of alcohol    Drug use: No        Immunizations:   Immunization History   Administered Date(s) Administered    Covid-19 Vaccine Pfizer 30 mcg/0.3 ml 04/16/2021, 05/10/2021    Covid-19 Vaccine Pfizer Eduardo-Sucrose 30 mcg/0.3 ml 02/09/2022    FLULAVAL 6  months & older 0.5 ml Prefilled syringe (86167) 11/12/2021, 11/19/2022, 10/19/2023    TDAP 12/17/2015   Pended Date(s) Pended    TDAP 11/02/2024       Medications (Active prior to today's visit):  Current Outpatient Medications   Medication Sig Dispense Refill    semaglutide (OZEMPIC, 0.25 OR 0.5 MG/DOSE,) 2 MG/3ML Subcutaneous Solution Pen-injector Administer 0.5 mg Q week 9 mL 0    fluticasone-salmeterol (ADVAIR HFA) 230-21 MCG/ACT Inhalation Aerosol Inhale 1 puff into the lungs 2 (two) times daily. 1 each 3    Nebulizers (Giggle AEROSOL DELIVERY SYSTEM) Does not apply Misc USE AS DIRECTED EVERY 2-4-6 HRS AS NEEDED      Microlet Lancets Does not apply Misc 1 strip by In Vitro route in the morning and 1 strip before bedtime. 100 each 3    Glucose Blood (CONTOUR NEXT TEST) In Vitro Strip Test blood sugar 2 times per day. 300 strip 3    albuterol 108 (90 Base) MCG/ACT Inhalation Aero Soln Inhale 2 puffs into the lungs every 6 (six) hours as needed. 1 each 1    Respiratory Therapy Supplies (NEBULIZER/TUBING/MOUTHPIECE) Does not apply Kit Use as directed Q 2-4-6 hrs prn 1 each 0    Spacer/Aero-Holding Chambers Does not apply Device Use with albuterol inhaler 1 each 0    clotrimazole-betamethasone 1-0.05 % External Cream Apply 1 Application topically 2 (two) times daily as needed. 60 g 0    Repaglinide 0.5 MG Oral Tab Take 1 tablet (0.5 mg total) by mouth 3 (three) times daily before meals. 270 tablet 1    amLODIPine-Olmesartan 10-40 MG Oral Tab Take 1 tablet by mouth daily. 90 tablet 3    atorvastatin 40 MG Oral Tab Take 1 tablet (40 mg total) by mouth at bedtime. 90 tablet 3    chlorthalidone 25 MG Oral Tab Take 1 tablet (25 mg total) by mouth daily. 90 tablet 3       Allergies:  Allergies[1]      ROS:   Review of Systems   Constitutional:  Negative for appetite change, fatigue and fever.   HENT:  Negative for hearing loss and nosebleeds.    Eyes:  Negative for pain and visual disturbance.   Respiratory:  Negative for  apnea and shortness of breath.    Cardiovascular:  Negative for chest pain, palpitations and leg swelling.   Gastrointestinal:  Negative for abdominal pain, blood in stool, constipation, diarrhea, nausea and vomiting.   Endocrine: Negative for polydipsia and polyuria.   Genitourinary:  Negative for decreased urine volume, frequency and hematuria.        No nocturia   Musculoskeletal:  Negative for arthralgias.   Skin:  Negative for rash.   Neurological:  Negative for dizziness, syncope and headaches.   Psychiatric/Behavioral:  Negative for dysphoric mood and sleep disturbance.        PHYSICAL EXAM:   VS: /71   Pulse 81   Ht 5' 11\" (1.803 m)   Wt 224 lb 12.8 oz (102 kg)   BMI 31.35 kg/m²     Physical Exam  Vitals reviewed.   Constitutional:       Appearance: Normal appearance.      Comments:      HENT:      Head: Normocephalic.      Right Ear: Hearing, tympanic membrane and ear canal normal.      Left Ear: Hearing, tympanic membrane and ear canal normal.      Nose: Nose normal. No rhinorrhea.      Mouth/Throat:      Mouth: Mucous membranes are moist.      Pharynx: Oropharynx is clear.   Eyes:      Extraocular Movements: Extraocular movements intact.      Conjunctiva/sclera: Conjunctivae normal.      Pupils: Pupils are equal, round, and reactive to light.   Neck:      Thyroid: No thyroid mass.      Vascular: No carotid bruit.   Cardiovascular:      Rate and Rhythm: Normal rate and regular rhythm.      Heart sounds: Normal heart sounds, S1 normal and S2 normal. No murmur heard.  Pulmonary:      Effort: Pulmonary effort is normal.      Breath sounds: Normal breath sounds.   Abdominal:      General: Abdomen is flat. Bowel sounds are normal.      Palpations: Abdomen is soft. There is no hepatomegaly, splenomegaly or mass.      Tenderness: There is no abdominal tenderness.      Hernia: No hernia is present.   Musculoskeletal:      Cervical back: Neck supple.      Comments: Spinal exam without scoliosis.       Feet:      Right foot:      Protective Sensation: 10 sites tested.  10 sites sensed.      Left foot:      Protective Sensation: 10 sites tested.  10 sites sensed.   Lymphadenopathy:      Cervical: No cervical adenopathy.   Skin:     General: Skin is warm.      Findings: No rash.   Neurological:      General: No focal deficit present.      Mental Status: He is alert.      Deep Tendon Reflexes:      Reflex Scores:       Patellar reflexes are 2+ on the right side and 2+ on the left side.  Psychiatric:         Attention and Perception: Attention normal.         Mood and Affect: Mood and affect normal.         LABORATORY RESULTS:         EKG / Spirometry : -     Radiology: No results found.     ASSESSMENT/PLAN:   Assessment   Encounter Diagnosis   Name Primary?    Physical exam Yes         CPE PLAN:    LABS / TEST & ORDERS for today's visit :Blood test(s) ordered today for send out to [facility] lab:  Orders Placed This Encounter   Procedures    CBC With Differential With Platelet    Comp Metabolic Panel (14)    Hemoglobin A1C    Lipid Panel    TSH W Reflex To Free T4    Vitamin D    Urinalysis with Culture Reflex    Microalb/Creat Ratio, Random Urine    TETANUS, DIPHTHERIA TOXOIDS AND ACELLULAR PERTUSIS VACCINE (TDAP), >7 YEARS, IM USE      Referrals: TETANUS, DIPHTHERIA TOXOIDS AND ACELLULAR PERTUSIS VACCINE (TDAP), >7 YEARS, IM USE  In-House; Urine dip.  Test/Procedures done today include: EKG.    IMMUNIZATIONS:  Tdap, given today.    RECOMMENDATIONS given include: ANTICIPATORY GUIDANCE  topics covered today include: safety (i.e. seat belts, helmets, sunscreen, protective sports gear ), nutrition (i.e. healthy meals and snacks (i.e. avoid junk food and high-carbohydrate foods); athletic conditioning, fluids; low fat milk, limit to less than 20 oz. a day; dental care ), and Healthy habits& Social competence & Responsibilities: Recommendations on physical activity; exercise daily or at least 3 times a week for  30-60 minutes doing cardiovascular exercise. Encourage to maintain the best physical and dental hygiene possible.  Consider a  if overweight and/or having difficult in staying active; attempt to keep a schedule that includes an adequate sleep and physical exercise / activities Patient educated on doing regular self testicular exam. Patient was educated on sexual transmitted disease. Best to abstain from sexual intercourse until he is ready to form a family. Use of condoms may prevent transmission of infections as well as pregnancy.    FOLLOW-UP: Schedule a follow-up visit in 12 months.          Orders This Visit:  Orders Placed This Encounter   Procedures    CBC With Differential With Platelet    Comp Metabolic Panel (14)    Hemoglobin A1C    Lipid Panel    TSH W Reflex To Free T4    Vitamin D    Urinalysis with Culture Reflex    Microalb/Creat Ratio, Random Urine    TETANUS, DIPHTHERIA TOXOIDS AND ACELLULAR PERTUSIS VACCINE (TDAP), >7 YEARS, IM USE       Meds This Visit:  Requested Prescriptions     Signed Prescriptions Disp Refills    semaglutide (OZEMPIC, 0.25 OR 0.5 MG/DOSE,) 2 MG/3ML Subcutaneous Solution Pen-injector 9 mL 0     Sig: Administer 0.5 mg Q week       Imaging & Referrals:  TETANUS, DIPHTHERIA TOXOIDS AND ACELLULAR PERTUSIS VACCINE (TDAP), >7 YEARS, IM USE         RANDOLPH RUDD MD       [1]   Allergies  Allergen Reactions    Seasonal OTHER (SEE COMMENTS)     Watery eyes, sneezing, itchy throat

## 2024-11-12 RX ORDER — REPAGLINIDE 0.5 MG/1
0.5 TABLET ORAL 3 TIMES DAILY
Qty: 270 TABLET | Refills: 3 | Status: SHIPPED | OUTPATIENT
Start: 2024-11-12

## 2024-11-12 NOTE — TELEPHONE ENCOUNTER
Refill passed per [facility] protocol.  Requested Prescriptions   Pending Prescriptions Disp Refills    REPAGLINIDE 0.5 MG Oral Tab [Pharmacy Med Name: REPAGLINIDE 0.5MG TABLETS] 270 tablet 1     Sig: TAKE 1 TABLET(0.5 MG) BY MOUTH THREE TIMES DAILY BEFORE MEALS       Diabetes Medication Protocol Passed - 11/12/2024  3:07 PM        Passed - Last A1C < 7.5 and within past 6 months     Lab Results   Component Value Date    A1C 6.7 (H) 11/02/2024             Passed - In person appointment or virtual visit in the past 6 mos or appointment in next 3 mos     Recent Outpatient Visits              1 week ago Physical exam    [facility] [place] [facility] Banerjee Ric[place], [place]    Office Visit    4 months ago Type 2 diabetes mellitus without complication, without long-term current use of insulin ([facility]    Office Visit    5 months ago Mild persistent reactive airway disease without complication ([facility])    [facility] [place] [facility] Banerjee Ricardo, MD    Office Visit    5 months ago Type 2 diabetes mellitus without complication, without long-term current use of insulin ([facility])    [facility] [place] [facility]anny Banerjee Ricardo, MD    Office Visit    9 months ago Uncontrolled type 2 diabetes mellitus with hyperglycemia ([facility])    [facility]    Office Visit          Future Appointments         Provider Department Appt Notes    In 4 days Arsenio Sidhu MD [facility], [address], [place] [address]                    Passed - Microalbumin procedure in past 12 months or taking ACE/ARB        Passed - EGFRCR or GFRNAA > 50     GFR Evaluation  EGFRCR: 70 , resulted on 11/2/2024          Passed - GFR in the past 12 months           Recent Outpatient Visits              1 week ago Physical exam    [place]  [facility] [address] [facility] [facility]    Office Visit    4 months ago Type 2 diabetes mellitus without complication, without long-term current use of insulin ([facility])    [facility] [address] [facility] [facility]    Office Visit    5 months ago Mild persistent reactive airway disease without complication (H[facility])    [facility] [address] [facility]    Office Visit    5 months ago Type 2 diabetes mellitus without complication, without long-term current use of insulin ([facility])    [facility] [address], Arsenio Chiu MD    Office Visit    9 months ago Uncontrolled type 2 diabetes mellitus with hyperglycemia ([facility])    [facility] [address] [facility]    Office Visit          Future Appointments         Provider Department Appt Notes    [facility] 4 [facility]

## 2024-11-16 ENCOUNTER — OFFICE VISIT (OUTPATIENT)
Dept: FAMILY MEDICINE CLINIC | Facility: CLINIC | Age: 39
End: 2024-11-16
Payer: COMMERCIAL

## 2024-11-16 ENCOUNTER — NURSE ONLY (OUTPATIENT)
Dept: INTERNAL MEDICINE CLINIC | Facility: CLINIC | Age: 39
End: 2024-11-16

## 2024-11-16 VITALS
BODY MASS INDEX: 32.06 KG/M2 | HEART RATE: 84 BPM | DIASTOLIC BLOOD PRESSURE: 63 MMHG | SYSTOLIC BLOOD PRESSURE: 112 MMHG | HEIGHT: 71 IN | WEIGHT: 229 LBS

## 2024-11-16 DIAGNOSIS — J45.30 MILD PERSISTENT REACTIVE AIRWAY DISEASE WITHOUT COMPLICATION (HCC): ICD-10-CM

## 2024-11-16 DIAGNOSIS — E11.9 TYPE 2 DIABETES MELLITUS WITHOUT COMPLICATION, WITHOUT LONG-TERM CURRENT USE OF INSULIN (HCC): ICD-10-CM

## 2024-11-16 DIAGNOSIS — E11.9 TYPE 2 DIABETES MELLITUS WITHOUT COMPLICATION, WITHOUT LONG-TERM CURRENT USE OF INSULIN (HCC): Primary | ICD-10-CM

## 2024-11-16 PROCEDURE — 3044F HG A1C LEVEL LT 7.0%: CPT | Performed by: FAMILY MEDICINE

## 2024-11-16 PROCEDURE — 92229 IMG RTA DETC/MNTR DS POC ALY: CPT | Performed by: FAMILY MEDICINE

## 2024-11-16 PROCEDURE — 99213 OFFICE O/P EST LOW 20 MIN: CPT | Performed by: FAMILY MEDICINE

## 2024-11-16 PROCEDURE — 3008F BODY MASS INDEX DOCD: CPT | Performed by: FAMILY MEDICINE

## 2024-11-16 PROCEDURE — 3074F SYST BP LT 130 MM HG: CPT | Performed by: FAMILY MEDICINE

## 2024-11-16 PROCEDURE — 3061F NEG MICROALBUMINURIA REV: CPT | Performed by: FAMILY MEDICINE

## 2024-11-16 PROCEDURE — 3078F DIAST BP <80 MM HG: CPT | Performed by: FAMILY MEDICINE

## 2024-11-16 PROCEDURE — 2033F EYE IMG VALID W/O RTNOPTHY: CPT | Performed by: FAMILY MEDICINE

## 2024-11-16 RX ORDER — ERGOCALCIFEROL 1.25 MG/1
CAPSULE, LIQUID FILLED ORAL
COMMUNITY
Start: 2024-11-03

## 2024-11-16 NOTE — PROGRESS NOTES
11/16/2024  8:57 AM    Abdullahi Mtz is a 39 year old male.    Chief complaint(s):   Chief Complaint   Patient presents with    Diabetes     F/u     Medication Follow-Up     Ozempic coverage      HPI:     Abdullahi Mtz primary complaint is regarding DM.     Patient Abdullahi Mtz is a 39 year old male is here to be evaluated for type 2 diabetes.  Specifically, male has type 2, insulin none requiring diabetes. Compliance with treatment has been fair.  Patient's diabetes was first diagnosed 2017.  Patient follows a 2000 calorie ADA diet.  Patient report experiencing the following diabetes related symptoms; Negative for polyuria, Negative for polydipsia, Positive for blurred vision.  Depression symptoms include none.  Tobacco screen: none  smoker.  Current meds include :  oral hypoglycemic include:  Prandin 0.5 mg TID  insulin/injectable : Ozempic  0.5 mg Q week .  Hypoglycemia severity is not applicable.he reports home blood glucose readings have been [phone] (#s) and believes  having fair glucose control.  Most recent lab results include glycohemoglobin 6.7 %, microalbuminuria have been negative.  In regard to preventative care, his last ophthalmology exam was in > 12 months ago.  Opthalmic evaluation have shown none pathology.  Concurrent relative health problems include HTN, elevated LFTs and hyperlipidemia.  In regards to the asthma he reports doing well without any further wheezing, shortness of breath or cough.  He has plenty of all of his medications.      HISTORY:  Past Medical History:    Dog bite of face    Essential hypertension    Hyperlipidemia    Renal stone    no surgical interventions were required, self expeled      Past Surgical History:   Procedure Laterality Date    Other surgical history  age 2    facial plastic surgery 2nd to dog bite      Family History   Problem Relation Age of Onset    Gastro-Intestinal Disorder Father         diverticulosis    Hypertension Mother     Stroke Mother          cerebrovascular accident (TIA's)    Dementia Maternal Grandfather     Other (Other) Maternal Grandmother         HD ?      Social History:   Social History     Socioeconomic History    Marital status:    Tobacco Use    Smoking status: Former     Current packs/day: 0.25     Types: Cigarettes    Smokeless tobacco: Never   Vaping Use    Vaping status: Never Used   Substance and Sexual Activity    Alcohol use: Yes     Alcohol/week: 0.0 standard drinks of alcohol    Drug use: No        Immunizations:   Immunization History   Administered Date(s) Administered    Covid-19 Vaccine Pfizer 30 mcg/0.3 ml 04/16/2021, 05/10/2021    Covid-19 Vaccine Pfizer Eduardo-Sucrose 30 mcg/0.3 ml 02/09/2022    FLULAVAL 6 months & older 0.5 ml Prefilled syringe (42665) 11/12/2021, 11/19/2022, 10/19/2023    TDAP 12/17/2015, 11/02/2024       Medications (Active prior to today's visit):  Current Outpatient Medications   Medication Sig Dispense Refill    ergocalciferol 1.25 MG (25747 UT) Oral Cap TAKE 1 CAPSULE BY MOUTH ONCE A WEEK FOR 8 WEEKS THEN 1 CAPSULE A MONTH FOR TEN MONTHS      Repaglinide 0.5 MG Oral Tab Take 1 tablet (0.5 mg total) by mouth 3 (three) times daily. BEFORE MEALS 270 tablet 3    Cholecalciferol (VITAMIN D3) 1.25 MG (98376 UT) Oral Tab Take 50,000 Units by mouth once a week. Take 1 tab po Q week for 8 weeks then 1 tab po Q month for 10 months 10 tablet 3    semaglutide (OZEMPIC, 0.25 OR 0.5 MG/DOSE,) 2 MG/3ML Subcutaneous Solution Pen-injector Administer 0.5 mg Q week 9 mL 0    fluticasone-salmeterol (ADVAIR HFA) 230-21 MCG/ACT Inhalation Aerosol Inhale 1 puff into the lungs 2 (two) times daily. 1 each 3    Nebulizers ([employer] AEROSOL DELIVERY SYSTEM) Does not apply Misc USE AS DIRECTED EVERY 2-4-6 HRS AS NEEDED      Microlet Lancets Does not apply Misc 1 strip by In Vitro route in the morning and 1 strip before bedtime. 100 each 3    Glucose Blood (CONTOUR NEXT TEST) In Vitro Strip Test blood sugar 2 times per day.  300 strip 3    albuterol 108 (90 Base) MCG/ACT Inhalation Aero Soln Inhale 2 puffs into the lungs every 6 (six) hours as needed. 1 each 1    Respiratory Therapy Supplies (NEBULIZER/TUBING/MOUTHPIECE) Does not apply Kit Use as directed Q 2-4-6 hrs prn 1 each 0    Spacer/Aero-Holding Chambers Does not apply Device Use with albuterol inhaler 1 each 0    clotrimazole-betamethasone 1-0.05 % External Cream Apply 1 Application topically 2 (two) times daily as needed. 60 g 0    amLODIPine-Olmesartan 10-40 MG Oral Tab Take 1 tablet by mouth daily. 90 tablet 3    atorvastatin 40 MG Oral Tab Take 1 tablet (40 mg total) by mouth at bedtime. 90 tablet 3    chlorthalidone 25 MG Oral Tab Take 1 tablet (25 mg total) by mouth daily. 90 tablet 3       Allergies:  Allergies[1]      ROS:   Review of Systems   Constitutional:  Negative for appetite change, fatigue and fever.   Respiratory:  Negative for shortness of breath.    Cardiovascular:  Negative for chest pain.   Gastrointestinal:  Negative for abdominal pain.   Musculoskeletal:  Negative for myalgias.   Skin:  Negative for rash.   Neurological:  Negative for dizziness, weakness and headaches.       PHYSICAL EXAM:   VS: /63   Pulse 84   Ht 5' 11\" (1.803 m)   Wt 229 lb (103.9 kg)   BMI 31.94 kg/m²     Physical Exam  Vitals reviewed.   Constitutional:       Appearance: Normal appearance. He is well-developed.   HENT:      Head: Normocephalic.   Eyes:      General: No scleral icterus.     Conjunctiva/sclera: Conjunctivae normal.   Cardiovascular:      Rate and Rhythm: Normal rate.   Pulmonary:      Effort: Pulmonary effort is normal.   Musculoskeletal:      Cervical back: Neck supple.   Skin:     Findings: No rash.   Psychiatric:         Mood and Affect: Mood normal.         LABORATORY RESULTS:   No results found for: \"URCOLOR\", \"URCLA\", \"URINELEUK\", \"URINENITRITE\", \"URINEBLOOD\"   Results for orders placed or performed in visit on 11/02/24   CBC With Differential With  Platelet    Collection Time: 11/02/24  8:55 AM   Result Value Ref Range    WBC 6.7 4.0 - 11.0 x10(3) uL    RBC 4.70 4.30 - 5.70 x10(6)uL    HGB 12.9 (L) 13.0 - 17.5 g/dL    HCT 38.6 (L) 39.0 - 53.0 %    MCV 82.1 80.0 - 100.0 fL    MCH 27.4 26.0 - 34.0 pg    MCHC 33.4 31.0 - 37.0 g/dL    RDW-SD 38.6 35.1 - 46.3 fL    RDW 13.1 11.0 - 15.0 %    .0 (L) 150.0 - 450.0 10(3)uL    Neutrophil Absolute Prelim 3.49 1.50 - 7.70 x10 (3) uL    Neutrophil Absolute 3.49 1.50 - 7.70 x10(3) uL    Lymphocyte Absolute 2.30 1.00 - 4.00 x10(3) uL    Monocyte Absolute 0.63 0.10 - 1.00 x10(3) uL    Eosinophil Absolute 0.21 0.00 - 0.70 x10(3) uL    Basophil Absolute 0.03 0.00 - 0.20 x10(3) uL    Immature Granulocyte Absolute 0.02 0.00 - 1.00 x10(3) uL    Neutrophil % 52.4 %    Lymphocyte % 34.4 %    Monocyte % 9.4 %    Eosinophil % 3.1 %    Basophil % 0.4 %    Immature Granulocyte % 0.3 %   Comp Metabolic Panel (14)    Collection Time: 11/02/24  8:55 AM   Result Value Ref Range    Glucose 114 (H) 70 - 99 mg/dL    Sodium 136 136 - 145 mmol/L    Potassium 4.3 3.5 - 5.1 mmol/L    Chloride 102 98 - 112 mmol/L    CO2 26.0 21.0 - 32.0 mmol/L    Anion Gap 8 0 - 18 mmol/L    BUN 20 9 - 23 mg/dL    Creatinine 1.32 (H) 0.70 - 1.30 mg/dL    BUN/CREA Ratio 15.2 10.0 - 20.0    Calcium, Total 10.0 8.7 - 10.4 mg/dL    Calculated Osmolality 285 275 - 295 mOsm/kg    eGFR-Cr 70 >=60 mL/min/1.73m2    ALT 29 10 - 49 U/L    AST 27 <34 U/L    Alkaline Phosphatase 121 (H) 45 - 117 U/L    Bilirubin, Total 0.6 0.3 - 1.2 mg/dL    Total Protein 8.2 5.7 - 8.2 g/dL    Albumin 4.4 3.2 - 4.8 g/dL    Globulin  3.8 (H) 2.0 - 3.5 g/dL    A/G Ratio 1.2 1.0 - 2.0    Patient Fasting for CMP? Yes    Hemoglobin A1C    Collection Time: 11/02/24  8:55 AM   Result Value Ref Range    HgbA1C 6.7 (H) <5.7 %    Estimated Average Glucose 146 (H) 68 - 126 mg/dL   Lipid Panel    Collection Time: 11/02/24  8:55 AM   Result Value Ref Range    Cholesterol, Total 124 <200 mg/dL    HDL  Cholesterol 33 (L) 40 - 59 mg/dL    Triglycerides 95 30 - 149 mg/dL    LDL Cholesterol 73 <100 mg/dL    VLDL 15 0 - 30 mg/dL    Non HDL Chol 91 <130 mg/dL    Patient Fasting for Lipid? Yes    TSH W Reflex To Free T4    Collection Time: 11/02/24  8:55 AM   Result Value Ref Range    TSH 1.030 0.550 - 4.780 uIU/mL   Urinalysis with Culture Reflex    Collection Time: 11/02/24  8:55 AM    Specimen: Urine   Result Value Ref Range    Urine Color Light-Yellow Yellow    Clarity Urine Clear Clear    Spec Gravity 1.006 1.005 - 1.030    Glucose Urine Normal Normal mg/dL    Bilirubin Urine Negative Negative    Ketones Urine Negative Negative mg/dL    Blood Urine Negative Negative    pH Urine 5.5 5.0 - 8.0    Protein Urine Negative Negative mg/dL    Urobilinogen Urine Normal Normal    Nitrite Urine Negative Negative    Leukocyte Esterase Urine Negative Negative    Microscopic Microscopic not indicated    Microalb/Creat Ratio, Random Urine    Collection Time: 11/02/24  8:55 AM   Result Value Ref Range    Microalbumin, Urine <0.30 mg/dL    Creatinine Ur Random 51.50 mg/dL    Malb/Cre Calc     Vitamin D, 25-Hydroxy    Collection Time: 11/02/24  8:55 AM   Result Value Ref Range    Vitamin D, 25OH, Total 20.4 (L) 30.0 - 100.0 ng/mL       EKG / Spirometry : -     Radiology: No results found.     ASSESSMENT/PLAN:   Assessment   Encounter Diagnoses   Name Primary?    Type 2 diabetes mellitus without complication, without long-term current use of insulin (HCC) Yes    Mild persistent reactive airway disease without complication (HCC)        DIABETES A&P    LABORATORY & ORDERS: Blood test(s) ordered today ;   Orders Placed This Encounter   Procedures    Diabetic Retinopathy Exam  OU - Both Eyes     Additional orders include: CPM  MEDICATIONS:    ergocalciferol 1.25 MG (32878 UT) Oral Cap, TAKE 1 CAPSULE BY MOUTH ONCE A WEEK FOR 8 WEEKS THEN 1 CAPSULE A MONTH FOR TEN MONTHS, Disp: , Rfl:     Repaglinide 0.5 MG Oral Tab, Take 1 tablet (0.5 mg  total) by mouth 3 (three) times daily. BEFORE MEALS, Disp: 270 tablet, Rfl: 3    Cholecalciferol (VITAMIN D3) 1.25 MG (16619 UT) Oral Tab, Take 50,000 Units by mouth once a week. Take 1 tab po Q week for 8 weeks then 1 tab po Q month for 10 months, Disp: 10 tablet, Rfl: 3    semaglutide (OZEMPIC, 0.25 OR 0.5 MG/DOSE,) 2 MG/3ML Subcutaneous Solution Pen-injector, Administer 0.5 mg Q week, Disp: 9 mL, Rfl: 0    fluticasone-salmeterol (ADVAIR HFA) 230-21 MCG/ACT Inhalation Aerosol, Inhale 1 puff into the lungs 2 (two) times daily., Disp: 1 each, Rfl: 3    Nebulizers ([employer] AEROSOL DELIVERY SYSTEM) Does not apply Misc, USE AS DIRECTED EVERY 2-4-6 HRS AS NEEDED, Disp: , Rfl:     Microlet Lancets Does not apply Misc, 1 strip by In Vitro route in the morning and 1 strip before bedtime., Disp: 100 each, Rfl: 3    Glucose Blood (CONTOUR NEXT TEST) In Vitro Strip, Test blood sugar 2 times per day., Disp: 300 strip, Rfl: 3    albuterol 108 (90 Base) MCG/ACT Inhalation Aero Soln, Inhale 2 puffs into the lungs every 6 (six) hours as needed., Disp: 1 each, Rfl: 1    Respiratory Therapy Supplies (NEBULIZER/TUBING/MOUTHPIECE) Does not apply Kit, Use as directed Q 2-4-6 hrs prn, Disp: 1 each, Rfl: 0    Spacer/Aero-Holding Chambers Does not apply Device, Use with albuterol inhaler, Disp: 1 each, Rfl: 0    clotrimazole-betamethasone 1-0.05 % External Cream, Apply 1 Application topically 2 (two) times daily as needed., Disp: 60 g, Rfl: 0    amLODIPine-Olmesartan 10-40 MG Oral Tab, Take 1 tablet by mouth daily., Disp: 90 tablet, Rfl: 3    atorvastatin 40 MG Oral Tab, Take 1 tablet (40 mg total) by mouth at bedtime., Disp: 90 tablet, Rfl: 3    chlorthalidone 25 MG Oral Tab, Take 1 tablet (25 mg total) by mouth daily., Disp: 90 tablet, Rfl: 3.  Requested Prescriptions      No prescriptions requested or ordered in this encounter     RECOMMENDATIONS: instructed in use of glucometer ( check fasting glucose rarely), return for training in  administering insulin injections, adherence to an 1800 calorie ADA diet,  5 pound weight loss, a graduated exercise program, HgbA1C level checked quarterly, daily foot self-inspection, need for yearly flu shots, and avoid all sodas, juices, candy, chocolates, cakes, ice cream, etc.      FOLLOW-UP: Schedule a follow-up visit in 6 months.              Orders This Visit:  Orders Placed This Encounter   Procedures    Diabetic Retinopathy Exam  OU - Both Eyes       Meds This Visit:  Requested Prescriptions      No prescriptions requested or ordered in this encounter       Imaging & Referrals:  None         RANDOLPH RUDD MD       [1]   Allergies  Allergen Reactions    Seasonal OTHER (SEE COMMENTS)     Watery eyes, sneezing, itchy throat

## 2024-12-23 NOTE — TELEPHONE ENCOUNTER
Dear Liilana,    After reviewing your responses, I would like you to come in for a swab to make sure we treat you correctly. This swab is to evaluate you for possible COVID and Strep Throat, rsv and influenza and should be scheduled for today or tomorrow. Please use the Schedule Now button in [facility] to schedule your swab. Otherwise, click this link to schedule a lab only appointment.    Lab appointments are not available at most locations on the weekends. If no Lab Only appointment is available, you should be seen in any of our convenient Urgent Care Centers for an in person visit, which can be found on our website here.    You will receive instructions with your results in [facility] once they are available.     If your symptoms worsen, you develop difficulty breathing, difficulty with drinking enough to stay hydrated, difficulty swallowing your saliva or have fevers for more than 5 days, please contact your primary care provider for an appointment or visit an Urgent Care Center to be seen.      Thanks again for choosing us as your health care partner.   Charles Brown MD, MD  Sore Throat in Children: Care Instructions  Overview     Infection by bacteria or a virus causes most sore throats. Cigarette smoke, dry air, air pollution, allergies, or yelling also can cause a sore throat. Sore throats can be painful and annoying. Fortunately, most sore throats go away on their own.  Home treatment may help your child feel better sooner. Antibiotics are not needed unless your child has a strep infection.  Follow-up care is a key part of your child's treatment and safety. Be sure to make and go to all appointments, and call your doctor if your child is having problems. It's also a good idea to know your child's test results and keep a list of the medicines your child takes.  How can you care for your child at home?  If the doctor prescribed antibiotics for your child, give them as directed. Do not stop using  Please review.  Protocol failed / Has no protocol.     Requested Prescriptions   Pending Prescriptions Disp Refills    semaglutide (OZEMPIC, 0.25 OR 0.5 MG/DOSE,) 2 MG/3ML Subcutaneous Solution Pen-injector 5 each 1     Sig: Administer a dose of 0.25 mg subcutaneously once weekly, for 4 weeks then 0.5 mg Q week       Diabetes Medication Protocol Failed - 2/3/2024 11:28 AM        Failed - Last A1C < 7.5 and within past 6 months     Lab Results   Component Value Date    A1C 8.3 (H) 09/23/2023             Passed - In person appointment or virtual visit in the past 6 mos or appointment in next 3 mos     Recent Outpatient Visits              2 months ago Type 2 diabetes mellitus without complication, without long-term current use of insulin ([facility])    [facility]    Nurse Only    2 months ago Type 2 diabetes mellitus without complication, without long-term current use of insulin ([facility]    [facility] Visit    3 months ago Primary hypertension    [facility]    Office Visit    1 year ago Type 2 diabetes mellitus without complication, without long-term current use of insulin ([facility]    Office Visit    1 year ago Type 2 diabetes mellitus without complication, without long-term current use of insulin ([facility]    [facility]          Future Appointments         Provider Department Appt Notes    In 5 days Arsenio Sidhu MD [facility] 3 month f/u               Passed - EGFRCR or GFRNAA > 50     GFR Evaluation  EGFRCR: 68 , resulted on 10/25/2023          Passed - GFR in the past 12 months           Future Appointments         Provider Department Appt Notes     In 5 days Arsenio Sidhu MD [facility] 3 month f/u           Recent Outpatient Visits              2 months ago Type 2 diabetes mellitus without complication, without long-term current use of insulin ([facility])    [facility]    Nurse Only    2 months ago Type 2 diabetes mellitus without complication, without long-term current use of insulin ([facility]    [facility] Visit    3 months ago Primary hypertension    [facility]    Office Visit    1 year ago Type 2 diabetes mellitus without complication, without long-term current use of insulin ([facility]    Office Visit    1 year ago Type 2 diabetes mellitus without complication, without long-term current use of insulin ([facility]    [facility]            them just because your child feels better. Your child needs to take the full course of antibiotics.  Have your child gargle with warm salt water several times a day to help reduce swelling and relieve pain. Mix 1/2 teaspoon of salt in 1 cup of warm water. Most children can gargle when they are 6 years old.  Give acetaminophen (Tylenol) or ibuprofen (Advil, Motrin) for pain. Do not use ibuprofen if your child is less than 6 months old unless the doctor gave you instructions to use it. Be safe with medicines. Read and follow all instructions on the label. Do not give aspirin to anyone younger than 20. It has been linked to Reye syndrome, a serious illness.  Children over 6 years old can try sucking on lollipops or hard candy.  Have your child drink plenty of fluids. Drinks such as warm water or warm soup may ease throat pain. Cold foods like Popsicles and ice cream can soothe the throat.  Keep your child away from smoke. Do not smoke or let anyone else smoke around your child or in your house. Smoke irritates the throat.  Place a cool-mist humidifier by your child's bed or close to your child. This may make it easier for your child to breathe. Follow the directions for cleaning the machine.  When should you call for help?   Call 911 anytime you think your child may need emergency care. For example, call if:    Your child is confused, does not know where they are, or is extremely sleepy or hard to wake up.   Call your doctor now or seek immediate medical care if:    Your child has a new or higher fever.     Your child has a fever with a stiff neck or a severe headache.     Your child has any trouble breathing.     Your child cannot swallow or cannot drink enough because of throat pain.     Your child coughs up discolored or bloody mucus.   Watch closely for changes in your child's health, and be sure to contact your doctor if:    Your child has any new symptoms, such as a rash, an earache, vomiting, or nausea.     Your  "child is not getting better as expected.   Where can you learn more?  Go to https://www.[employer].net/patiented  Enter V819 in the search box to learn more about \"Sore Throat in Children: Care Instructions.\"  Current as of: September 27, 2023  Content Version: 14.3    2024 [employer].   Care instructions adapted under license by your healthcare professional. If you have questions about a medical condition or this instruction, always ask your healthcare professional. [employer] disclaims any warranty or liability for your use of this information.          "

## 2024-12-30 DIAGNOSIS — Z00.00 PHYSICAL EXAM: ICD-10-CM

## 2025-01-06 RX ORDER — SEMAGLUTIDE 0.68 MG/ML
INJECTION, SOLUTION SUBCUTANEOUS
Qty: 9 ML | Refills: 0 | Status: SHIPPED | OUTPATIENT
Start: 2025-01-06

## 2025-01-06 NOTE — TELEPHONE ENCOUNTER
REFILL PASSED PER [facility] PROTOCOLS    Requested Prescriptions   Pending Prescriptions Disp Refills    semaglutide (OZEMPIC, 0.25 OR 0.5 MG/DOSE,) 2 MG/3ML Subcutaneous Solution Pen-injector 9 mL 0     Sig: Administer 0.5 mg Q week       Diabetes Medication Protocol Passed - 1/6/2025  2:22 PM        Passed - Last A1C < 7.5 and within past 6 months     Lab Results   Component Value Date    A1C 6.7 (H) 11/02/2024             Passed - In person appointment or virtual visit in the past 6 mos or appointment in next 3 mos     Recent Outpatient Visits              1 month ago Type 2 diabetes mellitus without complication, without long-term current use of insulin ([facility])    [facility]    Nurse Only    1 month ago Type 2 diabetes mellitus without complication, without long-term current use of insulin ([facility]    [facility]    2 months ago Physical exam    [facility]    Office Visit    6 months ago Type 2 diabetes mellitus without complication, without long-term current use of insulin ([facility]    Office Visit    7 months ago Mild persistent reactive airway disease without complication ([facility]    [facility]          Future Appointments         Provider Department Appt Notes    In 4 months [facility]                    Passed - Microalbumin procedure in past 12 months or taking ACE/ARB        Passed - EGFRCR or GFRNAA > 50     GFR Evaluation  EGFRCR: 70 , resulted on 11/2/2024          Passed - GFR in the past 12 months             Future Appointments         Provider Department Appt Notes    In 4 months Arsenio Sidhu  MD [facility] 6 mnth f/u          Recent Outpatient Visits              1 month ago Type 2 diabetes mellitus without complication, without long-term current use of insulin ([facility])    [facility]    Nurse Only    1 month ago Type 2 diabetes mellitus without complication, without long-term current use of insulin ([facility])    [facility]    Office Visit    2 months ago Physical exam    [facility]    Office Visit    6 months ago Type 2 diabetes mellitus without complication, without long-term current use of insulin ([facility])    [facility]    Office Visit    7 months ago Mild persistent reactive airway disease without complication ([facility])    [facility], [facility]    Office Visit

## 2025-01-14 DIAGNOSIS — I10 PRIMARY HYPERTENSION: ICD-10-CM

## 2025-01-17 RX ORDER — CHLORTHALIDONE 25 MG/1
25 TABLET ORAL DAILY
Qty: 90 TABLET | Refills: 3 | Status: SHIPPED | OUTPATIENT
Start: 2025-01-17

## 2025-01-17 RX ORDER — AMLODIPINE AND OLMESARTAN MEDOXOMIL 10; 40 MG/1; MG/1
1 TABLET ORAL DAILY
Qty: 90 TABLET | Refills: 3 | Status: SHIPPED | OUTPATIENT
Start: 2025-01-17

## 2025-01-17 NOTE — TELEPHONE ENCOUNTER
Addended by: LEANDER GARCIA on: 3/4/2024 10:34 AM     Modules accepted: Orders     Refill passed [facility] Protocol.

## 2025-01-17 NOTE — TELEPHONE ENCOUNTER
Refill passed per [facility] protocol.    Requested Prescriptions   Pending Prescriptions Disp Refills    CHLORTHALIDONE 25 MG Oral Tab [Pharmacy Med Name: CHLORTHALIDONE 25MG TABLETS] 90 tablet 3     Sig: TAKE 1 TABLET(25 MG) BY MOUTH DAILY       Hypertension Medications Protocol Passed - 1/17/2025  9:26 AM        Passed - CMP or BMP in past 12 months        Passed - Last BP reading less than 140/90     BP Readings from Last 1 Encounters:   11/16/24 112/63               Passed - In person appointment or virtual visit in the past 12 mos or appointment in next 3 mos     Recent Outpatient Visits              2 months ago Type 2 diabetes mellitus without complication, without long-term current use of insulin ([facility])    [facility] [place]    Nurse Only    2 months ago Type 2 diabetes mellitus without complication, without long-term current use of insulin ([facility])    [facility] [place] [address] [place] [place]    Office Visit    2 months ago Physical exam    [facility] [facility] [place]    Office Visit    6 months ago Type 2 diabetes mellitus without complication, without long-term current use of insulin ([facility])    [facility] [place] [place]    Office Visit    7 months ago Mild persistent reactive airway disease without complication ([facility])    [facility] [place] [place]    Office Visit          Future Appointments         Provider Department Appt Notes    In 4 months [facility] Sid[facility]                    Passed - EGFRCR or GFRNAA > 50     GFR Evaluation  EGFRCR: 70 , resulted on 11/2/2024          Passed - Medication is active on med list           Future Appointments         Provider Department Appt Notes    In 4 months Arsenio Sidhu MD  [facility] 6 mnth f/u          Recent Outpatient Visits              2 months ago Type 2 diabetes mellitus without complication, without long-term current use of insulin ([facility])    [facility]    Nurse Only    2 months ago Type 2 diabetes mellitus without complication, without long-term current use of insulin (HCC)    [facility] [place] [facility] [place] [place]    Office Visit    2 months ago Physical exam    [facility] [place] [facility] [place] [place]    Office Visit    6 months ago Type 2 diabetes mellitus without complication, without long-term current use of insulin (HCC)    [facility] [place] [facility] [place], [place]    Office Visit    7 months ago Mild persistent reactive airway disease without complication (HCC)    [facility] [place] [facility] [place], [place]    Office Visit

## 2025-03-29 DIAGNOSIS — Z00.00 PHYSICAL EXAM: ICD-10-CM

## 2025-04-01 RX ORDER — SEMAGLUTIDE 0.68 MG/ML
INJECTION, SOLUTION SUBCUTANEOUS
Qty: 9 ML | Refills: 0 | Status: SHIPPED | OUTPATIENT
Start: 2025-04-01

## 2025-04-01 NOTE — TELEPHONE ENCOUNTER
Refill passed per [facility] protocol.    Requested Prescriptions   Pending Prescriptions Disp Refills    semaglutide (OZEMPIC, 0.25 OR 0.5 MG/DOSE,) 2 MG/3ML Subcutaneous Solution Pen-injector 9 mL 0     Sig: Administer 0.5 mg Q week       Diabetes Medication Protocol Passed - 4/1/2025  4:54 PM        Passed - Last A1C < 7.5 and within past 6 months     Lab Results   Component Value Date    A1C 6.7 (H) 11/02/2024             Passed - In person appointment or virtual visit in the past 6 mos or appointment in next 3 mos     Recent Outpatient Visits              4 months ago Type 2 diabetes mellitus without complication, without long-term current use of insulin ([facility])    [facility] [address], [place]    Nurse Only    4 months ago Type 2 diabetes mellitus without complication, without long-term current use of insulin ([facility])    [facility] [place] [facility] [place] [place]    Office Visit    5 months ago [facility] exam    [facility]    Office Visit    9 months ago Type 2 diabetes mellitus without complication, without long-term current use of insulin ([facility])    [facility] [place] [facility] [place] [place]    Office Visit    10 months ago Mild persistent reactive airway disease without complication ([facility])    [facility] [place] Ban[facility] Ric[place], [place]    Office Visit                      Passed - Microalbumin procedure in past 12 months or taking ACE/ARB        Passed - EGFRCR or GFRNAA > 50     GFR Evaluation  EGFRCR: 70 , resulted on 11/2/2024          Passed - GFR in the past 12 months        Passed - Medication is active on med list             Recent Outpatient Visits              4 months ago Type 2 diabetes mellitus without complication, without long-term current use of insulin ([facility])    [facility], [address], [place]    Nurse  Only    4 months ago Type 2 diabetes mellitus without complication, without long-term current use of insulin ([facility]    Office Visit    5 months ago Physical exam    [facility]    Office Visit    9 months ago Type 2 diabetes mellitus without complication, without long-term current use of insulin ([facility])    [facility]    Office Visit    10 months ago Mild persistent reactive airway disease without complication ([facility])    [facility]    Office Visit

## 2025-04-01 NOTE — TELEPHONE ENCOUNTER
Last office visit 11/16/24 and was advised to return in about 6 months (around 5/16/2025).

## 2025-04-03 RX ORDER — FLUTICASONE PROPIONATE AND SALMETEROL XINAFOATE 230; 21 UG/1; UG/1
1 AEROSOL, METERED RESPIRATORY (INHALATION) 2 TIMES DAILY
Qty: 12 G | Refills: 0 | OUTPATIENT
Start: 2025-04-03

## 2025-04-03 RX ORDER — FLUTICASONE PROPIONATE AND SALMETEROL XINAFOATE 230; 21 UG/1; UG/1
1 AEROSOL, METERED RESPIRATORY (INHALATION) 2 TIMES DAILY
Qty: 12 G | Refills: 0 | Status: SHIPPED | OUTPATIENT
Start: 2025-04-03

## 2025-06-07 RX ORDER — FLUTICASONE PROPIONATE AND SALMETEROL XINAFOATE 230; 21 UG/1; UG/1
1 AEROSOL, METERED RESPIRATORY (INHALATION) 2 TIMES DAILY
Qty: 12 G | Refills: 1 | Status: SHIPPED | OUTPATIENT
Start: 2025-06-07

## 2025-07-08 NOTE — TELEPHONE ENCOUNTER
Refill Request:    Current Outpatient Medications   Medication Sig Dispense Refill    atorvastatin 40 MG Oral Tab Take 1 tablet (40 mg total) by mouth at bedtime. 90 tablet 3     Please advise

## 2025-07-11 RX ORDER — ATORVASTATIN CALCIUM 40 MG/1
40 TABLET, FILM COATED ORAL NIGHTLY
Qty: 90 TABLET | Refills: 3 | Status: SHIPPED | OUTPATIENT
Start: 2025-07-11

## 2025-07-11 NOTE — TELEPHONE ENCOUNTER
Refill Pass Per [facility] Protocol.    Requested Prescriptions     Pending Prescriptions Disp Refills    atorvastatin 40 MG Oral Tab 90 tablet 3     Sig: Take 1 tablet (40 mg total) by mouth at bedtime.

## 2025-07-30 DIAGNOSIS — Z00.00 PHYSICAL EXAM: ICD-10-CM

## 2025-08-01 RX ORDER — SEMAGLUTIDE 0.68 MG/ML
0.5 INJECTION, SOLUTION SUBCUTANEOUS WEEKLY
Qty: 9 ML | Refills: 0 | Status: SHIPPED | OUTPATIENT
Start: 2025-08-01

## 2025-08-06 ENCOUNTER — HOSPITAL ENCOUNTER (OUTPATIENT)
Age: 40
Discharge: HOME | End: 2025-08-06

## 2025-08-06 VITALS
HEART RATE: 82 BPM | OXYGEN SATURATION: 99 % | SYSTOLIC BLOOD PRESSURE: 120 MMHG | RESPIRATION RATE: 18 BRPM | DIASTOLIC BLOOD PRESSURE: 88 MMHG | TEMPERATURE: 98 F

## 2025-08-06 DIAGNOSIS — R42 DIZZINESS: Primary | ICD-10-CM

## 2025-08-06 DIAGNOSIS — R20.0 NUMBNESS OF FINGER: ICD-10-CM

## 2025-08-06 DIAGNOSIS — R55 SYNCOPE, NEAR: ICD-10-CM

## 2025-08-06 LAB
#MXD IC: 0.6 X10ˆ3/UL (ref 0.1–1)
BUN BLD-MCNC: 23 MG/DL (ref 7–18)
CHLORIDE BLD-SCNC: 98 MMOL/L (ref 98–112)
CO2 BLD-SCNC: 28 MMOL/L (ref 21–32)
CREAT BLD-MCNC: 1.7 MG/DL (ref 0.7–1.3)
EGFRCR SERPLBLD CKD-EPI 2021: 52 ML/MIN/1.73M2 (ref 60–?)
GLUCOSE BLD-MCNC: 106 MG/DL (ref 70–99)
GLUCOSE BLDC GLUCOMTR-MCNC: 102 MG/DL (ref 70–99)
HCT VFR BLD AUTO: 42 % (ref 39–53)
HCT VFR BLD CALC: 44 % (ref 37–53)
HGB BLD-MCNC: 13.9 G/DL (ref 13–17.5)
ISTAT IONIZED CALCIUM FOR CHEM 8: 1.11 MMOL/L (ref 1.12–1.32)
LYMPHOCYTES # BLD AUTO: 3 X10ˆ3/UL (ref 1–4)
LYMPHOCYTES NFR BLD AUTO: 40 %
MCH RBC QN AUTO: 27.8 PG (ref 26–34)
MCHC RBC AUTO-ENTMCNC: 33.1 G/DL (ref 31–37)
MCV RBC AUTO: 84 FL (ref 80–100)
MIXED CELL %: 7.9 %
NEUTROPHILS # BLD AUTO: 4 X10ˆ3/UL (ref 1.5–7.7)
NEUTROPHILS NFR BLD AUTO: 52.1 %
PLATELET # BLD AUTO: 113 X10ˆ3/UL (ref 150–450)
POTASSIUM BLD-SCNC: 4.8 MMOL/L (ref 3.6–5.1)
RBC # BLD AUTO: 5 X10ˆ6/UL (ref 4.3–5.7)
SODIUM BLD-SCNC: 136 MMOL/L (ref 136–145)
TROPONIN I BLD-MCNC: <0.02 NG/ML (ref ?–0.05)
WBC # BLD AUTO: 7.6 X10ˆ3/UL (ref 4–11)

## 2025-08-06 PROCEDURE — 84484 ASSAY OF TROPONIN QUANT: CPT | Performed by: NURSE PRACTITIONER

## 2025-08-06 PROCEDURE — 80047 BASIC METABLC PNL IONIZED CA: CPT | Performed by: NURSE PRACTITIONER

## 2025-08-06 PROCEDURE — 99214 OFFICE O/P EST MOD 30 MIN: CPT | Performed by: NURSE PRACTITIONER

## 2025-08-06 PROCEDURE — 93000 ELECTROCARDIOGRAM COMPLETE: CPT | Performed by: NURSE PRACTITIONER

## 2025-08-06 PROCEDURE — 85025 COMPLETE CBC W/AUTO DIFF WBC: CPT | Performed by: NURSE PRACTITIONER

## 2025-08-06 PROCEDURE — 82962 GLUCOSE BLOOD TEST: CPT | Performed by: NURSE PRACTITIONER

## 2025-08-06 RX ORDER — SODIUM CHLORIDE 9 MG/ML
1000 INJECTION, SOLUTION INTRAVENOUS ONCE
Status: COMPLETED | OUTPATIENT
Start: 2025-08-06 | End: 2025-08-06

## 2025-08-07 LAB
ATRIAL RATE: 74 BPM
P AXIS: 28 DEGREES
P-R INTERVAL: 150 MS
Q-T INTERVAL: 392 MS
QRS DURATION: 94 MS
QTC CALCULATION (BEZET): 435 MS
R AXIS: 34 DEGREES
T AXIS: 30 DEGREES
VENTRICULAR RATE: 74 BPM

## (undated) NOTE — LETTER
2/26/2022              [address] [place] [address]         To Whom it may concern: This is to certify that Melina Cabral had an appointment on 2/26/2022 at 8:26 AM with Kim Rodriguez MD.   Patient's hypertension, diabetes, hyperlipidemia. His last A1c was at 6.9. There is been no episodes of hypoglycemia.  Recently added more BP medivcations today      Sincerely,        Kim Rodriguez MD  [address] [place] [address], [facility]  Tamera Urias [address]  [phone]        Document electronically generated by:  Kim Rodriguez MD

## (undated) NOTE — LETTER
2ASTHMA ACTION PLAN for Abdullahi Mtz     : 1985     Date: 24  Doctor:  RANDOLPH RUDD MD  Phone for doctor or clinic: [facility], [address] [address] [phone]           ACT Goal: 20 or greater    Call your provider if you require your rescue/quick reliever medication more than 2-3 times in a 24 hour period.    If you require your rescue inhaler/medication more than 2-3 times weekly, your asthma may not be under proper control and you should seek medical attention.    *Quick Relievers are Xopenex and Albuterol*    You can use the colors of a traffic light to help learn about your asthma medicines.  Year Round       1. Green - Go! % of Personal Best Peak Flow   Use controller medicine.   Breathing is good  No cough or wheeze  Can work and play Medicine How much to take When to take it    Medications       Sympathomimetics Instructions     albuterol 108 (90 Base) MCG/ACT Inhalation Aero Soln Inhale 2 puffs into the lungs every 6 (six) hours as needed.     fluticasone-salmeterol (ADVAIR HFA) 230-21 MCG/ACT Inhalation Aerosol Inhale 1 puff into the lungs 2 (two) times daily.     albuterol (2.5 MG/3ML) 0.083% Inhalation Nebu Soln Take 1.5 mL (1.25 mg total) by nebulization every 4 (four) hours as needed for Wheezing.     albuterol 108 (90 Base) MCG/ACT Inhalation Aero Soln Inhale 2 puffs into the lungs every 4 (four) hours as needed for Wheezing.     albuterol 108 (90 Base) MCG/ACT Inhalation Aero Soln Inhale 2 puffs into the lungs every 4 (four) hours as needed for Wheezing.                    2. Yellow - Caution. 50-79% Personal Best Peak Flow  Use reliever medicine to keep an asthma attack from getting bad.   Cough  Quick Relievers  Wheezing  Tight Chest  Wake up at night Medicine How much to take When to take it    If symptoms are not improving in 24-48 hrs, call office for further instructions  Medications        Sympathomimetics Instructions     albuterol 108 (90 Base) MCG/ACT Inhalation Aero Soln Inhale 2 puffs into the lungs every 6 (six) hours as needed.     fluticasone-salmeterol (ADVAIR HFA) 230-21 MCG/ACT Inhalation Aerosol Inhale 1 puff into the lungs 2 (two) times daily.     albuterol (2.5 MG/3ML) 0.083% Inhalation Nebu Soln Take 1.5 mL (1.25 mg total) by nebulization every 4 (four) hours as needed for Wheezing.     albuterol 108 (90 Base) MCG/ACT Inhalation Aero Soln Inhale 2 puffs into the lungs every 4 (four) hours as needed for Wheezing.     albuterol 108 (90 Base) MCG/ACT Inhalation Aero Soln Inhale 2 puffs into the lungs every 4 (four) hours as needed for Wheezing.                    3. Red - Stop! Danger! <50% Personal Best Peak Flow  Continue Controller Medications But ADD:   Medicine not helping  Breathing is hard and fast  Nose opens wide  Can't walk  Ribs show  Can't talk well Medicine How much to take When to take it    If your symptoms do not improve in ONE hour -  go to the emergency room or call 911 immediately! If symptoms improve, call office for appointment immediately.    Albuterol inhaler 2 puffs every 20 minutes for three treatments       Don't forget:  Rinse mouth after using inhaler  Use spacer for inhaler  Remember to get your Flu vaccine every fall!    [x] Asthma Action Plan reviewed with the caregiver and patient, and a copy of the plan was given to the patient/caregiver.   [] Asthma Action Plan reviewed with the caregiver and patient on the phone, and copy mailed to patient/caregiver or sent via [employer].     Signatures:   Provider  RANDOLPH RUDD MD Patient  Abdullahi Mtz Caretaker

## (undated) NOTE — LETTER
1/28/2019              Aris Gustafson        [address] [address]         To Whom it may concern: This is to certify that Aris Gustafson is established patient at our clinic.   He is being managed regarding his type 2 diabe

## (undated) NOTE — LETTER
2/29/2020              [address] [place] [id number]         To Whom it may concern:     This is to certify that Gilford Fragmin had an appointment on 2/29/2020 at 9:47 AM with Edouard Norris MD. Patient's hyper

## (undated) NOTE — LETTER
03/11/19        [address] [place] [address]      Dear Elo Zambrano,    [id number] records indicate that you have outstanding lab work and or testing that was ordered for you and has not yet been completed:  Orders Placed This Encounter

## (undated) NOTE — MR AVS SNAPSHOT
[address], [address]  [facility]  [phone]               Thank you for choosing us for your health care visit with Nessa Melendrez MD.  We are glad to serve you and happy to provide you with this summ Amlodipine-Olmesartan 10-40 MG Tabs   Take 1 tablet by mouth daily. Commonly known as:  ANTONIO           atorvastatin 40 MG Tabs   TAKE 1 TABLET BY MOUTH EVERY NIGHT AT BEDTIME   What changed:  See the new instructions.    Commonly known as:  LIPITOR walking, light jogging, cycling, swimming, etc.) for a goal of at least 150 minutes per week. Moderation of alcohol consumption Men: limit to <= 2 drinks* per day. Women and lighter weight persons: limit to <= 1 drink* per day.                       [facility]

## (undated) NOTE — LETTER
4/9/2018          To Whom It May Concern:    Clem Stacy is also for my care and may return to work at this time. He may return to work on 04/09/18. Activity is restricted as follows: none.   It is been confirm the patient does have diabetes with an